# Patient Record
Sex: FEMALE | Race: WHITE
[De-identification: names, ages, dates, MRNs, and addresses within clinical notes are randomized per-mention and may not be internally consistent; named-entity substitution may affect disease eponyms.]

---

## 2018-07-06 ENCOUNTER — HOSPITAL ENCOUNTER (EMERGENCY)
Dept: HOSPITAL 83 - ED | Age: 58
Discharge: ANOTHER HOSPITAL | End: 2018-07-06
Payer: MEDICAID

## 2018-07-06 VITALS — WEIGHT: 275 LBS | BODY MASS INDEX: 53.99 KG/M2 | HEIGHT: 60 IN

## 2018-07-06 DIAGNOSIS — Z79.899: ICD-10-CM

## 2018-07-06 DIAGNOSIS — I77.89: Primary | ICD-10-CM

## 2018-07-06 PROBLEM — I70.90 ARTERIAL OCCLUSION: Status: ACTIVE | Noted: 2018-07-06

## 2018-07-06 LAB
ALBUMIN SERPL-MCNC: 3.8 GM/DL (ref 3.1–4.5)
ALP SERPL-CCNC: 79 U/L (ref 45–117)
ALT SERPL W P-5'-P-CCNC: 18 U/L (ref 12–78)
APTT PPP: 22.9 SECONDS (ref 20.8–31.5)
AST SERPL-CCNC: 21 IU/L (ref 3–35)
BASOPHILS # BLD AUTO: 0 10*3/UL (ref 0–0.1)
BASOPHILS NFR BLD AUTO: 0.4 % (ref 0–1)
BUN SERPL-MCNC: 31 MG/DL (ref 7–24)
CHLORIDE SERPL-SCNC: 111 MMOL/L (ref 98–107)
CREAT SERPL-MCNC: 1.09 MG/DL (ref 0.55–1.02)
EOSINOPHIL # BLD AUTO: 0 10*3/UL (ref 0–0.4)
EOSINOPHIL # BLD AUTO: 0.1 % (ref 1–4)
ERYTHROCYTE [DISTWIDTH] IN BLOOD BY AUTOMATED COUNT: 14.2 % (ref 0–14.5)
HCT VFR BLD AUTO: 36 % (ref 37–47)
HGB BLD-MCNC: 11.5 G/DL (ref 12–16)
INR BLD: 1 (ref 2–3.5)
LYMPHOCYTES # BLD AUTO: 0.8 10*3/UL (ref 1.3–4.4)
LYMPHOCYTES NFR BLD AUTO: 10.2 % (ref 27–41)
MCH RBC QN AUTO: 30.4 PG (ref 27–31)
MCHC RBC AUTO-ENTMCNC: 31.9 G/DL (ref 33–37)
MCV RBC AUTO: 95.2 FL (ref 81–99)
MONOCYTES # BLD AUTO: 0.4 10*3/UL (ref 0.1–1)
MONOCYTES NFR BLD MANUAL: 4.3 % (ref 3–9)
NEUT #: 6.8 10*3/UL (ref 2.3–7.9)
NEUT %: 84.9 % (ref 47–73)
NRBC BLD QL AUTO: 0 % (ref 0–0)
PLATELET # BLD AUTO: 163 10*3/UL (ref 130–400)
PMV BLD AUTO: 10.3 FL (ref 9.6–12.3)
POTASSIUM SERPL-SCNC: 4.7 MMOL/L (ref 3.5–5.1)
PROT SERPL-MCNC: 7.2 GM/DL (ref 6.4–8.2)
RBC # BLD AUTO: 3.78 10*6/UL (ref 4.1–5.1)
SODIUM SERPL-SCNC: 141 MMOL/L (ref 136–145)
WBC NRBC COR # BLD AUTO: 8.1 10*3/UL (ref 4.8–10.8)

## 2018-07-07 ENCOUNTER — APPOINTMENT (OUTPATIENT)
Dept: GENERAL RADIOLOGY | Age: 58
DRG: 181 | End: 2018-07-07
Attending: FAMILY MEDICINE
Payer: MEDICAID

## 2018-07-07 ENCOUNTER — APPOINTMENT (OUTPATIENT)
Dept: CT IMAGING | Age: 58
DRG: 181 | End: 2018-07-07
Attending: FAMILY MEDICINE
Payer: MEDICAID

## 2018-07-07 ENCOUNTER — ANESTHESIA (OUTPATIENT)
Dept: OPERATING ROOM | Age: 58
DRG: 181 | End: 2018-07-07
Payer: MEDICAID

## 2018-07-07 ENCOUNTER — ANESTHESIA EVENT (OUTPATIENT)
Dept: OPERATING ROOM | Age: 58
DRG: 181 | End: 2018-07-07
Payer: MEDICAID

## 2018-07-07 ENCOUNTER — HOSPITAL ENCOUNTER (INPATIENT)
Age: 58
LOS: 3 days | Discharge: HOME HEALTH | DRG: 181 | End: 2018-07-10
Attending: FAMILY MEDICINE | Admitting: INTERNAL MEDICINE
Payer: MEDICAID

## 2018-07-07 VITALS — TEMPERATURE: 95.5 F | SYSTOLIC BLOOD PRESSURE: 131 MMHG | DIASTOLIC BLOOD PRESSURE: 100 MMHG | OXYGEN SATURATION: 99 %

## 2018-07-07 PROBLEM — I48.0 PAROXYSMAL ATRIAL FIBRILLATION (HCC): Status: ACTIVE | Noted: 2018-07-07

## 2018-07-07 PROBLEM — E66.01 MORBID OBESITY WITH BMI OF 50.0-59.9, ADULT (HCC): Status: ACTIVE | Noted: 2018-07-07

## 2018-07-07 PROBLEM — I10 UNCONTROLLED HYPERTENSION: Status: ACTIVE | Noted: 2018-07-07

## 2018-07-07 PROBLEM — M79.7 FIBROMYALGIA: Status: ACTIVE | Noted: 2018-07-07

## 2018-07-07 PROBLEM — K21.9 GERD (GASTROESOPHAGEAL REFLUX DISEASE): Status: ACTIVE | Noted: 2018-07-07

## 2018-07-07 PROBLEM — Z79.01 ANTICOAGULANT LONG-TERM USE: Status: ACTIVE | Noted: 2018-07-07

## 2018-07-07 PROBLEM — D64.9 ANEMIA: Status: ACTIVE | Noted: 2018-07-07

## 2018-07-07 PROBLEM — I73.9 PERIPHERAL VASCULAR OCCLUSIVE DISEASE (HCC): Status: ACTIVE | Noted: 2018-07-07

## 2018-07-07 PROBLEM — E83.51 HYPOCALCEMIA: Status: ACTIVE | Noted: 2018-07-07

## 2018-07-07 PROBLEM — G89.29 CHRONIC PAIN: Status: ACTIVE | Noted: 2018-07-07

## 2018-07-07 LAB
ABO/RH: NORMAL
ANION GAP SERPL CALCULATED.3IONS-SCNC: 16 MMOL/L (ref 7–16)
ANTIBODY SCREEN: NORMAL
APTT: 103.2 SEC (ref 24.5–35.1)
APTT: 143 SEC (ref 24.5–35.1)
APTT: 42.7 SEC (ref 24.5–35.1)
BUN BLDV-MCNC: 26 MG/DL (ref 8–23)
CALCIUM IONIZED: 1.19 MMOL/L (ref 1.15–1.33)
CALCIUM SERPL-MCNC: 8.5 MG/DL (ref 8.6–10.2)
CHLORIDE BLD-SCNC: 107 MMOL/L (ref 98–107)
CHOLESTEROL, TOTAL: 192 MG/DL (ref 0–199)
CO2: 21 MMOL/L (ref 22–29)
CREAT SERPL-MCNC: 0.8 MG/DL (ref 0.5–1)
EKG ATRIAL RATE: 45 BPM
EKG Q-T INTERVAL: 486 MS
EKG QRS DURATION: 152 MS
EKG QTC CALCULATION (BAZETT): 524 MS
EKG R AXIS: -50 DEGREES
EKG T AXIS: 87 DEGREES
EKG VENTRICULAR RATE: 70 BPM
GFR AFRICAN AMERICAN: >60
GFR NON-AFRICAN AMERICAN: >60 ML/MIN/1.73
GLUCOSE BLD-MCNC: 82 MG/DL (ref 74–109)
HBA1C MFR BLD: 5.2 % (ref 4–5.6)
HCT VFR BLD CALC: 34.4 % (ref 34–48)
HDLC SERPL-MCNC: 55 MG/DL
HEMOGLOBIN: 11.4 G/DL (ref 11.5–15.5)
INR BLD: 1.1
LACTIC ACID: 1.1 MMOL/L (ref 0.5–2.2)
LDL CHOLESTEROL CALCULATED: 124 MG/DL (ref 0–99)
MCH RBC QN AUTO: 30.8 PG (ref 26–35)
MCHC RBC AUTO-ENTMCNC: 33.1 % (ref 32–34.5)
MCV RBC AUTO: 93 FL (ref 80–99.9)
PDW BLD-RTO: 14 FL (ref 11.5–15)
PLATELET # BLD: 122 E9/L (ref 130–450)
PMV BLD AUTO: 10.8 FL (ref 7–12)
POTASSIUM SERPL-SCNC: 4 MMOL/L (ref 3.5–5)
PROTHROMBIN TIME: 12.8 SEC (ref 9.3–12.4)
RBC # BLD: 3.7 E12/L (ref 3.5–5.5)
SODIUM BLD-SCNC: 144 MMOL/L (ref 132–146)
TOTAL CK: 307 U/L (ref 20–180)
TRIGL SERPL-MCNC: 64 MG/DL (ref 0–149)
TSH SERPL DL<=0.05 MIU/L-ACNC: 3.55 UIU/ML (ref 0.27–4.2)
VLDLC SERPL CALC-MCNC: 13 MG/DL
WBC # BLD: 5.9 E9/L (ref 4.5–11.5)

## 2018-07-07 PROCEDURE — 6360000002 HC RX W HCPCS: Performed by: FAMILY MEDICINE

## 2018-07-07 PROCEDURE — 85730 THROMBOPLASTIN TIME PARTIAL: CPT

## 2018-07-07 PROCEDURE — 93005 ELECTROCARDIOGRAM TRACING: CPT | Performed by: FAMILY MEDICINE

## 2018-07-07 PROCEDURE — 75635 CT ANGIO ABDOMINAL ARTERIES: CPT

## 2018-07-07 PROCEDURE — 2580000003 HC RX 258: Performed by: FAMILY MEDICINE

## 2018-07-07 PROCEDURE — 6370000000 HC RX 637 (ALT 250 FOR IP): Performed by: FAMILY MEDICINE

## 2018-07-07 PROCEDURE — 3700000000 HC ANESTHESIA ATTENDED CARE: Performed by: SURGERY

## 2018-07-07 PROCEDURE — 71045 X-RAY EXAM CHEST 1 VIEW: CPT

## 2018-07-07 PROCEDURE — 36415 COLL VENOUS BLD VENIPUNCTURE: CPT

## 2018-07-07 PROCEDURE — 83605 ASSAY OF LACTIC ACID: CPT

## 2018-07-07 PROCEDURE — 2500000003 HC RX 250 WO HCPCS: Performed by: NURSE ANESTHETIST, CERTIFIED REGISTERED

## 2018-07-07 PROCEDURE — 6360000002 HC RX W HCPCS: Performed by: NURSE ANESTHETIST, CERTIFIED REGISTERED

## 2018-07-07 PROCEDURE — 6360000002 HC RX W HCPCS

## 2018-07-07 PROCEDURE — 86901 BLOOD TYPING SEROLOGIC RH(D): CPT

## 2018-07-07 PROCEDURE — 82330 ASSAY OF CALCIUM: CPT

## 2018-07-07 PROCEDURE — 2580000003 HC RX 258: Performed by: STUDENT IN AN ORGANIZED HEALTH CARE EDUCATION/TRAINING PROGRAM

## 2018-07-07 PROCEDURE — C1757 CATH, THROMBECTOMY/EMBOLECT: HCPCS | Performed by: SURGERY

## 2018-07-07 PROCEDURE — 3600000002 HC SURGERY LEVEL 2 BASE: Performed by: SURGERY

## 2018-07-07 PROCEDURE — 80048 BASIC METABOLIC PNL TOTAL CA: CPT

## 2018-07-07 PROCEDURE — 2500000003 HC RX 250 WO HCPCS

## 2018-07-07 PROCEDURE — 80061 LIPID PANEL: CPT

## 2018-07-07 PROCEDURE — 83036 HEMOGLOBIN GLYCOSYLATED A1C: CPT

## 2018-07-07 PROCEDURE — 3600000012 HC SURGERY LEVEL 2 ADDTL 15MIN: Performed by: SURGERY

## 2018-07-07 PROCEDURE — 85027 COMPLETE CBC AUTOMATED: CPT

## 2018-07-07 PROCEDURE — 93010 ELECTROCARDIOGRAM REPORT: CPT | Performed by: INTERNAL MEDICINE

## 2018-07-07 PROCEDURE — 86850 RBC ANTIBODY SCREEN: CPT

## 2018-07-07 PROCEDURE — 84443 ASSAY THYROID STIM HORMONE: CPT

## 2018-07-07 PROCEDURE — 86900 BLOOD TYPING SEROLOGIC ABO: CPT

## 2018-07-07 PROCEDURE — 34201 REMOVAL OF ARTERY CLOT: CPT | Performed by: SURGERY

## 2018-07-07 PROCEDURE — 3700000001 HC ADD 15 MINUTES (ANESTHESIA): Performed by: SURGERY

## 2018-07-07 PROCEDURE — 2000000000 HC ICU R&B

## 2018-07-07 PROCEDURE — 85610 PROTHROMBIN TIME: CPT

## 2018-07-07 PROCEDURE — 6360000002 HC RX W HCPCS: Performed by: STUDENT IN AN ORGANIZED HEALTH CARE EDUCATION/TRAINING PROGRAM

## 2018-07-07 PROCEDURE — 2580000003 HC RX 258

## 2018-07-07 PROCEDURE — 04CK0ZZ EXTIRPATION OF MATTER FROM RIGHT FEMORAL ARTERY, OPEN APPROACH: ICD-10-PCS | Performed by: SURGERY

## 2018-07-07 PROCEDURE — 88304 TISSUE EXAM BY PATHOLOGIST: CPT

## 2018-07-07 PROCEDURE — 6370000000 HC RX 637 (ALT 250 FOR IP): Performed by: STUDENT IN AN ORGANIZED HEALTH CARE EDUCATION/TRAINING PROGRAM

## 2018-07-07 PROCEDURE — 82550 ASSAY OF CK (CPK): CPT

## 2018-07-07 PROCEDURE — 6360000004 HC RX CONTRAST MEDICATION: Performed by: RADIOLOGY

## 2018-07-07 RX ORDER — DABIGATRAN ETEXILATE 75 MG/1
75 CAPSULE, COATED PELLETS ORAL 2 TIMES DAILY
Status: ON HOLD | COMMUNITY
End: 2018-07-07

## 2018-07-07 RX ORDER — DEXTROSE MONOHYDRATE 50 MG/ML
100 INJECTION, SOLUTION INTRAVENOUS PRN
Status: DISCONTINUED | OUTPATIENT
Start: 2018-07-07 | End: 2018-07-07

## 2018-07-07 RX ORDER — ATORVASTATIN CALCIUM 40 MG/1
40 TABLET, FILM COATED ORAL NIGHTLY
Status: DISCONTINUED | OUTPATIENT
Start: 2018-07-07 | End: 2018-07-10 | Stop reason: HOSPADM

## 2018-07-07 RX ORDER — SODIUM CHLORIDE 0.9 % (FLUSH) 0.9 %
SYRINGE (ML) INJECTION
Status: DISCONTINUED
Start: 2018-07-07 | End: 2018-07-08

## 2018-07-07 RX ORDER — HYDROXYCHLOROQUINE SULFATE 200 MG/1
TABLET, FILM COATED ORAL DAILY
Status: ON HOLD | COMMUNITY
End: 2018-07-08

## 2018-07-07 RX ORDER — DEXTROSE MONOHYDRATE 25 G/50ML
12.5 INJECTION, SOLUTION INTRAVENOUS PRN
Status: DISCONTINUED | OUTPATIENT
Start: 2018-07-07 | End: 2018-07-07

## 2018-07-07 RX ORDER — METOPROLOL SUCCINATE 50 MG/1
50 TABLET, EXTENDED RELEASE ORAL 2 TIMES DAILY
Status: ON HOLD | COMMUNITY
End: 2018-07-08

## 2018-07-07 RX ORDER — METOPROLOL SUCCINATE 50 MG/1
50 TABLET, EXTENDED RELEASE ORAL 2 TIMES DAILY
Status: DISCONTINUED | OUTPATIENT
Start: 2018-07-07 | End: 2018-07-09

## 2018-07-07 RX ORDER — LEVOTHYROXINE SODIUM 0.03 MG/1
25 TABLET ORAL DAILY
Status: ON HOLD | COMMUNITY
End: 2018-07-10

## 2018-07-07 RX ORDER — NEOSTIGMINE METHYLSULFATE 1 MG/ML
INJECTION, SOLUTION INTRAVENOUS PRN
Status: DISCONTINUED | OUTPATIENT
Start: 2018-07-07 | End: 2018-07-07 | Stop reason: SDUPTHER

## 2018-07-07 RX ORDER — GLYCOPYRROLATE 1 MG/5 ML
SYRINGE (ML) INTRAVENOUS PRN
Status: DISCONTINUED | OUTPATIENT
Start: 2018-07-07 | End: 2018-07-07 | Stop reason: SDUPTHER

## 2018-07-07 RX ORDER — ONDANSETRON 2 MG/ML
4 INJECTION INTRAMUSCULAR; INTRAVENOUS EVERY 6 HOURS PRN
Status: DISCONTINUED | OUTPATIENT
Start: 2018-07-07 | End: 2018-07-10 | Stop reason: HOSPADM

## 2018-07-07 RX ORDER — SODIUM CHLORIDE, SODIUM LACTATE, POTASSIUM CHLORIDE, CALCIUM CHLORIDE 600; 310; 30; 20 MG/100ML; MG/100ML; MG/100ML; MG/100ML
INJECTION, SOLUTION INTRAVENOUS CONTINUOUS
Status: DISCONTINUED | OUTPATIENT
Start: 2018-07-07 | End: 2018-07-08

## 2018-07-07 RX ORDER — LISINOPRIL 5 MG/1
5 TABLET ORAL DAILY
Status: DISCONTINUED | OUTPATIENT
Start: 2018-07-07 | End: 2018-07-07

## 2018-07-07 RX ORDER — FENTANYL CITRATE 50 UG/ML
INJECTION, SOLUTION INTRAMUSCULAR; INTRAVENOUS PRN
Status: DISCONTINUED | OUTPATIENT
Start: 2018-07-07 | End: 2018-07-07 | Stop reason: SDUPTHER

## 2018-07-07 RX ORDER — HEPARIN SODIUM 1000 [USP'U]/ML
INJECTION, SOLUTION INTRAVENOUS; SUBCUTANEOUS PRN
Status: DISCONTINUED | OUTPATIENT
Start: 2018-07-07 | End: 2018-07-07 | Stop reason: SDUPTHER

## 2018-07-07 RX ORDER — ONDANSETRON 2 MG/ML
INJECTION INTRAMUSCULAR; INTRAVENOUS PRN
Status: DISCONTINUED | OUTPATIENT
Start: 2018-07-07 | End: 2018-07-07 | Stop reason: SDUPTHER

## 2018-07-07 RX ORDER — NICOTINE POLACRILEX 4 MG
15 LOZENGE BUCCAL PRN
Status: DISCONTINUED | OUTPATIENT
Start: 2018-07-07 | End: 2018-07-07

## 2018-07-07 RX ORDER — HEPARIN SODIUM 1000 [USP'U]/ML
60 INJECTION, SOLUTION INTRAVENOUS; SUBCUTANEOUS PRN
Status: DISCONTINUED | OUTPATIENT
Start: 2018-07-07 | End: 2018-07-10

## 2018-07-07 RX ORDER — ERGOCALCIFEROL (VITAMIN D2) 1250 MCG
50000 CAPSULE ORAL WEEKLY
Status: ON HOLD | COMMUNITY
End: 2018-07-08

## 2018-07-07 RX ORDER — FOLIC ACID 1 MG/1
1 TABLET ORAL DAILY
Status: ON HOLD | COMMUNITY
End: 2018-07-08

## 2018-07-07 RX ORDER — ACETAMINOPHEN 500 MG
500 TABLET ORAL EVERY 6 HOURS PRN
Status: ON HOLD | COMMUNITY
End: 2018-07-10 | Stop reason: HOSPADM

## 2018-07-07 RX ORDER — MIDAZOLAM HYDROCHLORIDE 1 MG/ML
INJECTION INTRAMUSCULAR; INTRAVENOUS PRN
Status: DISCONTINUED | OUTPATIENT
Start: 2018-07-07 | End: 2018-07-07 | Stop reason: SDUPTHER

## 2018-07-07 RX ORDER — FOLIC ACID 1 MG/1
1 TABLET ORAL DAILY
Status: DISCONTINUED | OUTPATIENT
Start: 2018-07-07 | End: 2018-07-10 | Stop reason: HOSPADM

## 2018-07-07 RX ORDER — SODIUM CHLORIDE 9 MG/ML
INJECTION, SOLUTION INTRAVENOUS CONTINUOUS PRN
Status: DISCONTINUED | OUTPATIENT
Start: 2018-07-07 | End: 2018-07-07 | Stop reason: SDUPTHER

## 2018-07-07 RX ORDER — LEVOTHYROXINE SODIUM 0.03 MG/1
25 TABLET ORAL DAILY
Status: DISCONTINUED | OUTPATIENT
Start: 2018-07-07 | End: 2018-07-10 | Stop reason: HOSPADM

## 2018-07-07 RX ORDER — MORPHINE SULFATE 2 MG/ML
2 INJECTION, SOLUTION INTRAMUSCULAR; INTRAVENOUS
Status: DISCONTINUED | OUTPATIENT
Start: 2018-07-07 | End: 2018-07-08

## 2018-07-07 RX ORDER — OXYCODONE HYDROCHLORIDE AND ACETAMINOPHEN 5; 325 MG/1; MG/1
2 TABLET ORAL EVERY 4 HOURS PRN
Status: DISCONTINUED | OUTPATIENT
Start: 2018-07-07 | End: 2018-07-09

## 2018-07-07 RX ORDER — LISINOPRIL 10 MG/1
5 TABLET ORAL DAILY
Status: ON HOLD | COMMUNITY
End: 2018-07-08

## 2018-07-07 RX ORDER — ERGOCALCIFEROL 1.25 MG/1
50000 CAPSULE ORAL WEEKLY
Status: DISCONTINUED | OUTPATIENT
Start: 2018-07-07 | End: 2018-07-07 | Stop reason: CLARIF

## 2018-07-07 RX ORDER — OXYCODONE HYDROCHLORIDE AND ACETAMINOPHEN 5; 325 MG/1; MG/1
1 TABLET ORAL EVERY 4 HOURS PRN
Status: DISCONTINUED | OUTPATIENT
Start: 2018-07-07 | End: 2018-07-09

## 2018-07-07 RX ORDER — ROCURONIUM BROMIDE 10 MG/ML
INJECTION, SOLUTION INTRAVENOUS PRN
Status: DISCONTINUED | OUTPATIENT
Start: 2018-07-07 | End: 2018-07-07 | Stop reason: SDUPTHER

## 2018-07-07 RX ORDER — HYDROXYCHLOROQUINE SULFATE 200 MG/1
200 TABLET, FILM COATED ORAL DAILY
Status: DISCONTINUED | OUTPATIENT
Start: 2018-07-07 | End: 2018-07-10

## 2018-07-07 RX ORDER — HYDRALAZINE HYDROCHLORIDE 20 MG/ML
10 INJECTION INTRAMUSCULAR; INTRAVENOUS EVERY 30 MIN PRN
Status: DISCONTINUED | OUTPATIENT
Start: 2018-07-07 | End: 2018-07-08

## 2018-07-07 RX ORDER — ACETAMINOPHEN 325 MG/1
650 TABLET ORAL EVERY 6 HOURS PRN
Status: DISCONTINUED | OUTPATIENT
Start: 2018-07-07 | End: 2018-07-10 | Stop reason: HOSPADM

## 2018-07-07 RX ORDER — LABETALOL HYDROCHLORIDE 5 MG/ML
10 INJECTION, SOLUTION INTRAVENOUS EVERY 30 MIN PRN
Status: DISCONTINUED | OUTPATIENT
Start: 2018-07-07 | End: 2018-07-08

## 2018-07-07 RX ORDER — LIDOCAINE HYDROCHLORIDE 20 MG/ML
INJECTION, SOLUTION INFILTRATION; PERINEURAL PRN
Status: DISCONTINUED | OUTPATIENT
Start: 2018-07-07 | End: 2018-07-07 | Stop reason: SDUPTHER

## 2018-07-07 RX ORDER — HEPARIN SODIUM 1000 [USP'U]/ML
30 INJECTION, SOLUTION INTRAVENOUS; SUBCUTANEOUS PRN
Status: DISCONTINUED | OUTPATIENT
Start: 2018-07-07 | End: 2018-07-10

## 2018-07-07 RX ORDER — PROPOFOL 10 MG/ML
INJECTION, EMULSION INTRAVENOUS PRN
Status: DISCONTINUED | OUTPATIENT
Start: 2018-07-07 | End: 2018-07-07 | Stop reason: SDUPTHER

## 2018-07-07 RX ORDER — HEPARIN SODIUM 10000 [USP'U]/100ML
12 INJECTION, SOLUTION INTRAVENOUS CONTINUOUS
Status: DISCONTINUED | OUTPATIENT
Start: 2018-07-07 | End: 2018-07-10

## 2018-07-07 RX ORDER — ERGOCALCIFEROL 1.25 MG/1
50000 CAPSULE ORAL WEEKLY
Status: DISCONTINUED | OUTPATIENT
Start: 2018-07-10 | End: 2018-07-10 | Stop reason: HOSPADM

## 2018-07-07 RX ORDER — DEXAMETHASONE SODIUM PHOSPHATE 10 MG/ML
INJECTION, SOLUTION INTRAMUSCULAR; INTRAVENOUS PRN
Status: DISCONTINUED | OUTPATIENT
Start: 2018-07-07 | End: 2018-07-07 | Stop reason: SDUPTHER

## 2018-07-07 RX ADMIN — METOPROLOL SUCCINATE 50 MG: 50 TABLET, FILM COATED, EXTENDED RELEASE ORAL at 21:13

## 2018-07-07 RX ADMIN — OXYCODONE HYDROCHLORIDE AND ACETAMINOPHEN 2 TABLET: 5; 325 TABLET ORAL at 17:18

## 2018-07-07 RX ADMIN — DEXAMETHASONE SODIUM PHOSPHATE 10 MG: 10 INJECTION, SOLUTION INTRAMUSCULAR; INTRAVENOUS at 14:04

## 2018-07-07 RX ADMIN — LEVOTHYROXINE SODIUM 25 MCG: 25 TABLET ORAL at 06:40

## 2018-07-07 RX ADMIN — SODIUM CHLORIDE, POTASSIUM CHLORIDE, SODIUM LACTATE AND CALCIUM CHLORIDE: 600; 310; 30; 20 INJECTION, SOLUTION INTRAVENOUS at 03:08

## 2018-07-07 RX ADMIN — ACETAMINOPHEN 650 MG: 325 TABLET, FILM COATED ORAL at 02:20

## 2018-07-07 RX ADMIN — FOLIC ACID 1 MG: 1 TABLET ORAL at 08:37

## 2018-07-07 RX ADMIN — FENTANYL CITRATE 50 MCG: 50 INJECTION, SOLUTION INTRAMUSCULAR; INTRAVENOUS at 14:31

## 2018-07-07 RX ADMIN — FENTANYL CITRATE 50 MCG: 50 INJECTION, SOLUTION INTRAMUSCULAR; INTRAVENOUS at 14:04

## 2018-07-07 RX ADMIN — HEPARIN SODIUM 12 UNITS/KG/HR: 10000 INJECTION, SOLUTION INTRAVENOUS at 19:55

## 2018-07-07 RX ADMIN — METOPROLOL SUCCINATE 50 MG: 50 TABLET, FILM COATED, EXTENDED RELEASE ORAL at 08:37

## 2018-07-07 RX ADMIN — ONDANSETRON 4 MG: 2 INJECTION INTRAMUSCULAR; INTRAVENOUS at 15:20

## 2018-07-07 RX ADMIN — MIDAZOLAM HYDROCHLORIDE 2 MG: 1 INJECTION, SOLUTION INTRAMUSCULAR; INTRAVENOUS at 13:58

## 2018-07-07 RX ADMIN — LIDOCAINE HYDROCHLORIDE 60 MG: 20 INJECTION, SOLUTION INFILTRATION; PERINEURAL at 14:04

## 2018-07-07 RX ADMIN — HYDROXYCHLOROQUINE SULFATE 200 MG: 200 TABLET, FILM COATED ORAL at 08:37

## 2018-07-07 RX ADMIN — IOPAMIDOL 120 ML: 755 INJECTION, SOLUTION INTRAVENOUS at 10:35

## 2018-07-07 RX ADMIN — ATORVASTATIN CALCIUM 40 MG: 40 TABLET, FILM COATED ORAL at 21:13

## 2018-07-07 RX ADMIN — LISINOPRIL 5 MG: 5 TABLET ORAL at 08:37

## 2018-07-07 RX ADMIN — Medication 3 MG: at 15:20

## 2018-07-07 RX ADMIN — HEPARIN SODIUM 5000 UNITS: 1000 INJECTION, SOLUTION INTRAVENOUS; SUBCUTANEOUS at 14:42

## 2018-07-07 RX ADMIN — Medication 0.6 MG: at 15:20

## 2018-07-07 RX ADMIN — CEFAZOLIN 3 G: 1 INJECTION, POWDER, FOR SOLUTION INTRAVENOUS at 14:18

## 2018-07-07 RX ADMIN — ROCURONIUM BROMIDE 40 MG: 10 INJECTION, SOLUTION INTRAVENOUS at 14:04

## 2018-07-07 RX ADMIN — SODIUM CHLORIDE: 9 INJECTION, SOLUTION INTRAVENOUS at 13:58

## 2018-07-07 RX ADMIN — PROPOFOL 150 MG: 10 INJECTION, EMULSION INTRAVENOUS at 14:04

## 2018-07-07 ASSESSMENT — PULMONARY FUNCTION TESTS
PIF_VALUE: 20
PIF_VALUE: 24
PIF_VALUE: 26
PIF_VALUE: 17
PIF_VALUE: 26
PIF_VALUE: 26
PIF_VALUE: 27
PIF_VALUE: 24
PIF_VALUE: 26
PIF_VALUE: 20
PIF_VALUE: 31
PIF_VALUE: 24
PIF_VALUE: 27
PIF_VALUE: 25
PIF_VALUE: 26
PIF_VALUE: 24
PIF_VALUE: 2
PIF_VALUE: 25
PIF_VALUE: 25
PIF_VALUE: 24
PIF_VALUE: 26
PIF_VALUE: 25
PIF_VALUE: 25
PIF_VALUE: 23
PIF_VALUE: 26
PIF_VALUE: 25
PIF_VALUE: 26
PIF_VALUE: 20
PIF_VALUE: 9
PIF_VALUE: 27
PIF_VALUE: 25
PIF_VALUE: 26
PIF_VALUE: 24
PIF_VALUE: 0
PIF_VALUE: 26
PIF_VALUE: 24
PIF_VALUE: 26
PIF_VALUE: 25
PIF_VALUE: 3
PIF_VALUE: 1
PIF_VALUE: 1
PIF_VALUE: 26
PIF_VALUE: 0
PIF_VALUE: 20
PIF_VALUE: 24
PIF_VALUE: 20
PIF_VALUE: 26
PIF_VALUE: 25
PIF_VALUE: 24
PIF_VALUE: 26
PIF_VALUE: 24
PIF_VALUE: 25
PIF_VALUE: 24
PIF_VALUE: 26
PIF_VALUE: 20
PIF_VALUE: 20
PIF_VALUE: 24
PIF_VALUE: 17
PIF_VALUE: 26
PIF_VALUE: 6
PIF_VALUE: 25
PIF_VALUE: 9
PIF_VALUE: 26
PIF_VALUE: 26
PIF_VALUE: 25
PIF_VALUE: 1
PIF_VALUE: 17
PIF_VALUE: 24
PIF_VALUE: 25
PIF_VALUE: 35
PIF_VALUE: 24
PIF_VALUE: 26
PIF_VALUE: 25
PIF_VALUE: 17
PIF_VALUE: 25
PIF_VALUE: 26
PIF_VALUE: 24
PIF_VALUE: 26
PIF_VALUE: 4
PIF_VALUE: 17
PIF_VALUE: 2
PIF_VALUE: 24
PIF_VALUE: 24
PIF_VALUE: 27
PIF_VALUE: 0
PIF_VALUE: 25
PIF_VALUE: 24
PIF_VALUE: 25
PIF_VALUE: 24

## 2018-07-07 ASSESSMENT — PAIN DESCRIPTION - ORIENTATION: ORIENTATION: RIGHT

## 2018-07-07 ASSESSMENT — PAIN DESCRIPTION - DESCRIPTORS: DESCRIPTORS: ACHING;DISCOMFORT;THROBBING

## 2018-07-07 ASSESSMENT — PAIN SCALES - GENERAL
PAINLEVEL_OUTOF10: 0
PAINLEVEL_OUTOF10: 4
PAINLEVEL_OUTOF10: 2
PAINLEVEL_OUTOF10: 4
PAINLEVEL_OUTOF10: 7
PAINLEVEL_OUTOF10: 0
PAINLEVEL_OUTOF10: 0

## 2018-07-07 ASSESSMENT — PAIN DESCRIPTION - LOCATION: LOCATION: LEG

## 2018-07-07 ASSESSMENT — PAIN DESCRIPTION - ONSET: ONSET: ON-GOING

## 2018-07-07 ASSESSMENT — PAIN DESCRIPTION - PROGRESSION: CLINICAL_PROGRESSION: NOT CHANGED

## 2018-07-07 ASSESSMENT — PAIN DESCRIPTION - PAIN TYPE: TYPE: ACUTE PAIN

## 2018-07-07 ASSESSMENT — PAIN DESCRIPTION - FREQUENCY: FREQUENCY: CONTINUOUS

## 2018-07-07 NOTE — H&P
infarction ([facility]     COPD (chronic obstructive pulmonary disease) ([facility]     Hypertension        Past Surgical History:          Procedure Laterality Date    BACK SURGERY         Medications Prior to Admission:      Prior to Admission medications    Medication Sig Start Date End Date Taking? Authorizing Provider   metoprolol succinate (TOPROL XL) 50 MG extended release tablet Take 50 mg by mouth 2 times daily   Yes Historical Provider, MD   ergocalciferol (ERGOCALCIFEROL) 08187 units capsule Take 50,000 Units by mouth once a week   Yes Historical Provider, MD   folic acid (FOLVITE) 1 MG tablet Take 1 mg by mouth daily   Yes Historical Provider, MD   lisinopril (PRINIVIL;ZESTRIL) 10 MG tablet Take 5 mg by mouth daily   Yes Historical Provider, MD   acetaminophen (TYLENOL) 500 MG tablet Take 500 mg by mouth every 6 hours as needed for Pain   Yes Historical Provider, MD   levothyroxine (SYNTHROID) 25 MCG tablet Take 25 mcg by mouth Daily   Yes Historical Provider, MD   hydroxychloroquine (PLAQUENIL) 200 MG tablet Take by mouth daily   Yes Historical Provider, MD       Allergies:  Pcn [penicillins]    Social History:      The patient currently lives At home. TOBACCO:   has no tobacco history on file. ETOH:   has no alcohol history on file. No drug use. Family History:     Reviewed in detail Positive as follows: Diabetes and cancer. No family history on file. REVIEW OF SYSTEMS:   Pertinent positives as noted in the HPI. All other systems reviewed and negative. PHYSICAL EXAM:    BP (!) 177/94   Pulse 84   Temp 98.7 °F (37.1 °C) (Oral)   Resp 16   Ht 5' 0.25\" (1.53 m)   Wt 299 lb 13.2 oz (136 kg)   SpO2 96%   BMI 58.07 kg/m²     General appearance:  Middle-age female, morbidly obese, mild to moderate painful  distress, appears stated age and cooperative. Afebrile. HEENT:  Normal cephalic, atraumatic without obvious deformity. Pupils equal, round, and reactive to light. Extra ocular muscles intact. Conjunctivae/corneas clear. Neck: Supple, with full range of motion. No jugular venous distention. Trachea midline. Respiratory:  Normal respiratory effort. Clear to auscultation, bilaterally without Rales/Wheezes/Rhonchi. Cardiovascular:  Regular rate and rhythm with normal S1/S2 without murmurs, rubs or gallops. Abdomen: Soft, non-tender, non-distended with normal bowel sounds. Musculoskeletal:  No clubbing, cyanosis or edema bilaterally. Full range of motion without deformity. Skin: Skin color, texture, turgor normal.  Right foot gray in color and cold to touch. Neurologic:  Cranial nerves: II-XII intact, grossly non-focal.  Psychiatric:  Alert and oriented, thought content appropriate, normal insight  Capillary Refill: Brisk,< 3 seconds   Peripheral Pulses: +1 palpable, left foot, no pulse palpable or by Doppler in the right foot. Labs: Documented above. No results for input(s): WBC, HGB, HCT, PLT in the last 72 hours. No results for input(s): NA, K, CL, CO2, BUN, CREATININE, CALCIUM, PHOS in the last 72 hours. Invalid input(s): MAGNES  No results for input(s): AST, ALT, BILIDIR, BILITOT, ALKPHOS in the last 72 hours. No results for input(s): INR in the last 72 hours. No results for input(s): Deward Feil in the last 72 hours. Urinalysis:    No results found for: Yung Distance, BACTERIA, RBCUA, BLOODU, [address], [address]    Radiology: Documented above.       No orders to display       ASSESSMENT:    Active Hospital Problems    Diagnosis Date Noted    Peripheral vascular occlusive disease ([address] [I73.9] 07/07/2018    Paroxysmal atrial fibrillation ([facility] [I48.0] 07/07/2018    Anemia [D64.9] 07/07/2018    Hypocalcemia [E83.51] 07/07/2018    Uncontrolled hypertension [I10] 07/07/2018    GERD (gastroesophageal reflux disease) [K21.9] 07/07/2018    Fibromyalgia [M79.7] 07/07/2018    Chronic pain [G89.29] 07/07/2018    Anticoagulant long-term use [Z79.01] 07/07/2018    Morbid obesity with BMI of 50.0-59.9, adult ([facility] [E66.01, Z68.43] 07/07/2018    Arterial occlusion ([facility] [I74.9] 07/06/2018     PLAN:   1. Severe peripheral vascular disease with ischemic right foot. Consult vascular surgery. Continue IV heparin. Monitor coagulation profile. IV morphine as needed for pain. Nothing by mouth. IV fluids. Patient would need CTA with runoff/angiography and intervention, will defer imaging studies to vascular surgery. Statin regimen, check lipid panel. Add aspirin to regimen. Check lactate and CPK. #2. Paroxysmal atrial fibrillation currently in sinus rate controlled. She ran out of her blood thinner for 3 weeks. Currently on heparin drip. Anticoagulation should be restarted once off the drip. #3. Hypocalcemia, check ionized calcium and replace. #4. Hypothyroidism, TSH  5. Uncontrolled hypertension, resume home meds. 6. Chronic pain with fibromyalgia, continue home medications. 7. GERD. Resume home meds. #8. Morbid obesity, lifestyle modification. DVT Prophylaxis: IV heparin  Diet:  NPO  Code Status: No Order FULL    PT/OT Eval Status: eval and treat when appropriate    Dispo - inpx/tele       Robles Pearson MD    Thank you No primary care provider on file. for the opportunity to be involved in this patient's care.

## 2018-07-07 NOTE — CONSULTS
Vascular Surgery Consultation Note    Reason for Consult:  RLE ischemia    HPI:    This is a 59 y.o. female who presents for evaluation of right leg pain. She states the pain began yesterday and got progressively worse. She states she \"tried everything\" without improvement. She denies a history of smoking. She denies a history of hyperlipidemia. She is on Pradaxa for a history of atrial fibrillation. She has been noncompliant for the last 3 weeks with her [address]. She states she also has a pacemaker after trying multiple medications to address rhythm issues. She does have a history of CVA, COPD, and HTN. She has never seen a Vascular Surgeon.       ROS: Negative if blank [], Positive if [x]  General Vascular   [] Fevers [] Claudication (Blocks)   [] Chills [] Rest Pain   [] Weight Loss [] Tissue Loss   [] Chest Pain [] Clotting Disorder   [] SOB at rest [x] Leg Swelling   [] SOB with exertion [] DVT/PE      [] Nausea    [] Vomiting [x] Stroke/TIA   [] Abdominal Pain [] Focal weakness   [] Melena [] Slurred Speech   [] Hematochezia [] Vision Changes   [] Hematuria    [] Dysuria [] Hx of Central Catheters   [] Wears Glasses/Contacts [] Dialysis and If so date initiated   [] Blindness       [] Difficulty swallowing        Past Medical History:   Diagnosis Date    Cerebral artery occlusion with cerebral infarction ([facility]     COPD (chronic obstructive pulmonary disease) ([facility]     Hypertension         Past Surgical History:   Procedure Laterality Date    BACK SURGERY         Current Medications:    heparin (porcine) 10 Units/kg/hr (07/07/18 0218)    lactated ringers 50 mL/hr at 07/07/18 0308      heparin (porcine), heparin (porcine), morphine, ondansetron, acetaminophen    folic acid  1 mg Oral Daily    hydroxychloroquine  200 mg Oral Daily    levothyroxine  25 mcg Oral Daily    lisinopril  5 mg Oral Daily    metoprolol succinate  50 mg Oral BID    atorvastatin  40 mg Oral Nightly    [START ON 7/10/2018] vitamin D

## 2018-07-08 LAB
ANION GAP SERPL CALCULATED.3IONS-SCNC: 11 MMOL/L (ref 7–16)
APTT: 102.5 SEC (ref 24.5–35.1)
APTT: 133.7 SEC (ref 24.5–35.1)
APTT: 41.9 SEC (ref 24.5–35.1)
BASOPHILS ABSOLUTE: 0 E9/L (ref 0–0.2)
BASOPHILS RELATIVE PERCENT: 0.2 % (ref 0–2)
BUN BLDV-MCNC: 18 MG/DL (ref 8–23)
CALCIUM IONIZED: 1.2 MMOL/L (ref 1.15–1.33)
CALCIUM SERPL-MCNC: 8.5 MG/DL (ref 8.6–10.2)
CHLORIDE BLD-SCNC: 106 MMOL/L (ref 98–107)
CO2: 22 MMOL/L (ref 22–29)
CREAT SERPL-MCNC: 0.8 MG/DL (ref 0.5–1)
EOSINOPHILS ABSOLUTE: 0 E9/L (ref 0.05–0.5)
EOSINOPHILS RELATIVE PERCENT: 0 % (ref 0–6)
GFR AFRICAN AMERICAN: >60
GFR NON-AFRICAN AMERICAN: >60 ML/MIN/1.73
GLUCOSE BLD-MCNC: 125 MG/DL (ref 74–109)
HCT VFR BLD CALC: 33.5 % (ref 34–48)
HEMOGLOBIN: 11 G/DL (ref 11.5–15.5)
LYMPHOCYTES ABSOLUTE: 0.18 E9/L (ref 1.5–4)
LYMPHOCYTES RELATIVE PERCENT: 2.6 % (ref 20–42)
MAGNESIUM: 2 MG/DL (ref 1.6–2.6)
MCH RBC QN AUTO: 30.4 PG (ref 26–35)
MCHC RBC AUTO-ENTMCNC: 32.8 % (ref 32–34.5)
MCV RBC AUTO: 92.5 FL (ref 80–99.9)
MONOCYTES ABSOLUTE: 0.24 E9/L (ref 0.1–0.95)
MONOCYTES RELATIVE PERCENT: 3.5 % (ref 2–12)
NEUTROPHILS ABSOLUTE: 5.55 E9/L (ref 1.8–7.3)
NEUTROPHILS RELATIVE PERCENT: 93.9 % (ref 43–80)
PDW BLD-RTO: 13.8 FL (ref 11.5–15)
PHOSPHORUS: 3.8 MG/DL (ref 2.5–4.5)
PLATELET # BLD: 138 E9/L (ref 130–450)
PMV BLD AUTO: 10.9 FL (ref 7–12)
POTASSIUM SERPL-SCNC: 4.9 MMOL/L (ref 3.5–5)
RBC # BLD: 3.62 E12/L (ref 3.5–5.5)
RBC # BLD: NORMAL 10*6/UL
SODIUM BLD-SCNC: 139 MMOL/L (ref 132–146)
WBC # BLD: 5.9 E9/L (ref 4.5–11.5)

## 2018-07-08 PROCEDURE — 2140000000 HC CCU INTERMEDIATE R&B

## 2018-07-08 PROCEDURE — 2000000000 HC ICU R&B

## 2018-07-08 PROCEDURE — 83735 ASSAY OF MAGNESIUM: CPT

## 2018-07-08 PROCEDURE — 6370000000 HC RX 637 (ALT 250 FOR IP): Performed by: INTERNAL MEDICINE

## 2018-07-08 PROCEDURE — 85025 COMPLETE CBC W/AUTO DIFF WBC: CPT

## 2018-07-08 PROCEDURE — 2580000003 HC RX 258: Performed by: STUDENT IN AN ORGANIZED HEALTH CARE EDUCATION/TRAINING PROGRAM

## 2018-07-08 PROCEDURE — 80048 BASIC METABOLIC PNL TOTAL CA: CPT

## 2018-07-08 PROCEDURE — 6370000000 HC RX 637 (ALT 250 FOR IP): Performed by: STUDENT IN AN ORGANIZED HEALTH CARE EDUCATION/TRAINING PROGRAM

## 2018-07-08 PROCEDURE — 82330 ASSAY OF CALCIUM: CPT

## 2018-07-08 PROCEDURE — 6370000000 HC RX 637 (ALT 250 FOR IP): Performed by: FAMILY MEDICINE

## 2018-07-08 PROCEDURE — 85730 THROMBOPLASTIN TIME PARTIAL: CPT

## 2018-07-08 PROCEDURE — 2580000003 HC RX 258: Performed by: SURGERY

## 2018-07-08 PROCEDURE — 36415 COLL VENOUS BLD VENIPUNCTURE: CPT

## 2018-07-08 PROCEDURE — 6360000002 HC RX W HCPCS: Performed by: FAMILY MEDICINE

## 2018-07-08 PROCEDURE — 84100 ASSAY OF PHOSPHORUS: CPT

## 2018-07-08 RX ORDER — HYDROXYCHLOROQUINE SULFATE 200 MG/1
200 TABLET, FILM COATED ORAL 2 TIMES DAILY
Status: DISCONTINUED | OUTPATIENT
Start: 2018-07-08 | End: 2018-07-10

## 2018-07-08 RX ORDER — HYDROXYCHLOROQUINE SULFATE 200 MG/1
200 TABLET, FILM COATED ORAL 2 TIMES DAILY
Status: ON HOLD | COMMUNITY
End: 2018-07-10

## 2018-07-08 RX ORDER — SODIUM CHLORIDE 0.9 % (FLUSH) 0.9 %
SYRINGE (ML) INJECTION
Status: DISCONTINUED
Start: 2018-07-08 | End: 2018-07-08

## 2018-07-08 RX ORDER — SODIUM CHLORIDE 9 MG/ML
INJECTION, SOLUTION INTRAVENOUS CONTINUOUS
Status: ACTIVE | OUTPATIENT
Start: 2018-07-08 | End: 2018-07-08

## 2018-07-08 RX ORDER — ALENDRONATE SODIUM 70 MG/1
70 TABLET ORAL
COMMUNITY

## 2018-07-08 RX ORDER — CHOLECALCIFEROL (VITAMIN D3) 50 MCG
2000 TABLET ORAL DAILY
Status: DISCONTINUED | OUTPATIENT
Start: 2018-07-08 | End: 2018-07-10 | Stop reason: HOSPADM

## 2018-07-08 RX ADMIN — METOPROLOL SUCCINATE 50 MG: 50 TABLET, FILM COATED, EXTENDED RELEASE ORAL at 08:00

## 2018-07-08 RX ADMIN — ATORVASTATIN CALCIUM 40 MG: 40 TABLET, FILM COATED ORAL at 21:19

## 2018-07-08 RX ADMIN — SODIUM CHLORIDE: 9 INJECTION, SOLUTION INTRAVENOUS at 15:47

## 2018-07-08 RX ADMIN — Medication 2000 UNITS: at 16:00

## 2018-07-08 RX ADMIN — SODIUM CHLORIDE, POTASSIUM CHLORIDE, SODIUM LACTATE AND CALCIUM CHLORIDE: 600; 310; 30; 20 INJECTION, SOLUTION INTRAVENOUS at 06:03

## 2018-07-08 RX ADMIN — OXYCODONE HYDROCHLORIDE AND ACETAMINOPHEN 2 TABLET: 5; 325 TABLET ORAL at 13:32

## 2018-07-08 RX ADMIN — HEPARIN SODIUM 7 UNITS/KG/HR: 10000 INJECTION, SOLUTION INTRAVENOUS at 17:26

## 2018-07-08 RX ADMIN — LEVOTHYROXINE SODIUM 25 MCG: 25 TABLET ORAL at 06:37

## 2018-07-08 RX ADMIN — HEPARIN SODIUM 4080 UNITS: 1000 INJECTION, SOLUTION INTRAVENOUS; SUBCUTANEOUS at 21:20

## 2018-07-08 ASSESSMENT — PAIN SCALES - GENERAL
PAINLEVEL_OUTOF10: 0
PAINLEVEL_OUTOF10: 8
PAINLEVEL_OUTOF10: 0

## 2018-07-08 NOTE — CARE COORDINATION
SOCIAL WORK AND DISCHARGE PLANNING: I met with pt in the unit. She is to have PT and OT to eval. She is not interested in hch. She is open to mark If needed. I left a mark list with her and will follow up with her after therapy evals are done. Pt lives with her brother in a mobile home. There is a steep ramp that pt said when it rains she is not able to go up on into the home. She said she stays in most of the time and only leaves on nice days and when has doctor appt's. No dme or hhc pta. She is interested in a mark in [place] if mark is recommended. sw to follow. Uday Campo. 7/8/2018

## 2018-07-08 NOTE — OP NOTE
[address]                   [address] [id number],  [facility]                                 OPERATIVE REPORT    PATIENT NAME: Edmundo Sanderson                      :        1960  MED REC NO:   [id number]                            ROOM:       3816  ACCOUNT NO:   [de-identified]                           ADMIT DATE: 2018  PROVIDER:     Collis Landau    DATE OF PROCEDURE:  2018    SURGEON:  Melody Martin MD    ASSISTANT:  Collis Landau, MD PGY-5    PREOPERATIVE DIAGNOSIS:  Acute right lower extremity ischemia, presumed  cardioembolic. POSTOPERATIVE DIAGNOSIS:  Acute right lower extremity ischemia, presumed  cardioembolic. OPERATIONS PERFORMED:  Right femoral embolectomy. ANESTHESIA:  General.    ESTIMATED BLOOD LOSS:  300 mL. COMPLICATIONS:  None. SPECIMENS:  Embolus. HISTORY:  The patient is a 59-year-old female with a significant past  medical history notable for atrial fibrillation, for which she has been on  oral anticoagulation for many years. She has been noncompliant with oral  anticoagulation for the last 3 weeks. She developed acute right lower  extremity pain and numbness yesterday. When symptoms failed to resolve at  home, she presented to the emergency department. She was found to have  absent pulses in the right lower extremity below the level of the femoral  artery. Vascular Surgery was consulted for right lower extremity ischemia. This was felt likely to be cardioembolic in origin. CTA of the abdominal  aorta with bilateral lower extremity run off was obtained and demonstrated  occlusion of the profunda femoris artery and distal occlusion of the  superficial femoral artery. Right femoral embolectomy was recommended to  the patient. The risks, benefits, and alternatives of the procedure were  discussed with her. She stated her understanding and agreed to proceed.     OPERATIVE PROCEDURE:  The patient was subcutaneous tissue was approximated with multiple layers of 2-0  Vicryl sutures. The dermal layer was approximated with multiple interrupted  3-0 Vicryl sutures. The skin was approximated with skin clips. Area was  cleaned, dried, and a sterile dressing was applied. Needle, sponge, and  instrument counts were reported as correct x2 at the conclusion of the  case. Dr. Nieves Hansen was present and scrubbed throughout the case. The  patient tolerated the procedure well without complications. She was  transferred to the cardiovascular intensive care unit in good condition.         Fabienne Dixon  D: 07/07/2018 19:50:11       T: 07/07/2018 20:59:52     [email or web address]  Job#: [id number]     Doc#: [id number]  CC:

## 2018-07-08 NOTE — PROGRESS NOTES
Hospitalist Progress Note      PCP: No primary care provider on file. Date of Admission: 7/7/2018    Chief Complaint: right leg pain    Hospital Course: *found to have a right femoral thrombus, had it removed on yesterday     Subjective: *no complaints      Medications:  Reviewed    Infusion Medications    heparin (porcine) 7 Units/kg/hr (07/08/18 1229)     Scheduled Medications    folic acid  1 mg Oral Daily    hydroxychloroquine  200 mg Oral Daily    levothyroxine  25 mcg Oral Daily    metoprolol succinate  50 mg Oral BID    atorvastatin  40 mg Oral Nightly    [START ON 7/10/2018] vitamin D  50,000 Units Oral Weekly     PRN Meds: heparin (porcine), heparin (porcine), ondansetron, acetaminophen, oxyCODONE-acetaminophen **OR** oxyCODONE-acetaminophen      Intake/Output Summary (Last 24 hours) at 07/08/18 1306  Last data filed at 07/08/18 0900   Gross per 24 hour   Intake          2370.42 ml   Output             1225 ml   Net          1145.42 ml       Exam:    /87   Pulse 70   Temp 97.9 °F (36.6 °C) (Temporal)   Resp 16   Ht 5' 0.25\" (1.53 m)   Wt 293 lb 11.2 oz (133.2 kg)   SpO2 96%   BMI 56.88 kg/m²           Gen: well  developed  HEENT: NC/AT, moist mucous membranes, no oropharyngeal erythema or exudate  Neck: supple, trachea midline, no anterior cervical or SC LAD  Heart:  irregular   Lungs:  cta bilaterally,  Abd: bowel sounds present, soft, nontender, nondistended, no masses  Extrem:  No clubbing, cyanosis,  Pos  edema  Skin: no rashes or lesions  Psych: A & O x3  Neuro: grossly intact, moves all four extremities.     Capillary Refill: Brisk,< 3 seconds   Peripheral Pulses: +2 palpable, equal bilaterally               Labs:   Recent Labs      07/07/18   1615  07/08/18   0322   WBC  5.9  5.9   HGB  11.4*  11.0*   HCT  34.4  33.5*   PLT  122*  138     Recent Labs      07/07/18   0533  07/08/18   0322   NA  144  139   K  4.0  4.9   CL  107  106   CO2  21*  22   BUN  26*  18   CREATININE 0.8  0.8   CALCIUM  8.5*  8.5*   PHOS   --   3.8     No results for input(s): AST, ALT, BILIDIR, BILITOT, ALKPHOS in the last 72 hours. Recent Labs      07/07/18   0533   INR  1.1     Recent Labs      07/07/18   0533   CKTOTAL  307*     No results for input(s): AST, ALT, ALB, BILIDIR, BILITOT, ALKPHOS in the last 72 hours.   Recent Labs      07/07/18   0533   LACTA  1.1     No results found for: [place]  No results found for: AMMONIA    Assessment:    Active Hospital Problems    Diagnosis Date Noted    Peripheral vascular occlusive disease ([facility] [I73.9] 07/07/2018    [facility]oxysmal [facility] fibrillation ([facility] [I48.0] 07/07/2018    Anemia [D64.9] 07/07/2018    Hypocalcemia [E83.51] 07/07/2018    Uncontrolled hypertension [I10] 07/07/2018    GERD (gastroesophageal reflux disease) [K21.9] 07/07/2018    Fibromyalgia [M79.7] 07/07/2018    Chronic pain [G89.29] 07/07/2018    Anticoagulant long-term use [Z79.01] 07/07/2018    Morbid obesity with BMI of 50.0-59.9, adult ([facility] [E66.01, Z68.43] 07/07/2018    Thrombosis of right femoral artery (HCC) [I74.3]     Arterial occlusion ([facility] [I74.9] 07/06/2018   HLD  hypothyroid  Plan:   Cont lipitor   cont plaqeunil  Cont synthroid  Cont toprol     DVT Prophylaxis: heparin  Diet: DIET CARDIAC;  Code Status: Full Code    PT/OT Eval Status: when stable    Dispo - home at discharge      Electronically signed by Heike Reyes DO on 7/8/2018 at 1:06 PM

## 2018-07-08 NOTE — PLAN OF CARE
Problem: Falls - Risk of:  Goal: Will remain free from falls  Will remain free from falls   Outcome: Met This Shift      Problem: Pain:  Goal: Pain level will decrease  Pain level will decrease   Outcome: Met This Shift      Problem: Bleeding:  Goal: Will show no signs and symptoms of excessive bleeding  Will show no signs and symptoms of excessive bleeding   Outcome: Met This Shift      Problem: Venous Thromboembolism:  Goal: Will show no signs or symptoms of venous thromboembolism  Will show no signs or symptoms of venous thromboembolism   Outcome: Met This Shift      Problem: Risk for Impaired Skin Integrity  Goal: Tissue integrity - skin and mucous membranes  Structural intactness and normal physiological function of skin and  mucous membranes.    Outcome: Met This Shift

## 2018-07-08 NOTE — PLAN OF CARE
Problem: Falls - Risk of:  Goal: Will remain free from falls  Will remain free from falls   Outcome: Ongoing    Goal: Absence of physical injury  Absence of physical injury   Outcome: Ongoing      Problem: Pain:  Goal: Pain level will decrease  Pain level will decrease   Outcome: Ongoing    Goal: Control of acute pain  Control of acute pain   Outcome: Ongoing    Goal: Control of chronic pain  Control of chronic pain   Outcome: Ongoing

## 2018-07-09 PROBLEM — R26.9 GAIT DISORDER: Chronic | Status: ACTIVE | Noted: 2018-07-09

## 2018-07-09 LAB
ANION GAP SERPL CALCULATED.3IONS-SCNC: 11 MMOL/L (ref 7–16)
APTT: 31.9 SEC (ref 24.5–35.1)
APTT: 72.8 SEC (ref 24.5–35.1)
APTT: 82 SEC (ref 24.5–35.1)
BASOPHILS ABSOLUTE: 0.01 E9/L (ref 0–0.2)
BASOPHILS RELATIVE PERCENT: 0.1 % (ref 0–2)
BUN BLDV-MCNC: 31 MG/DL (ref 8–23)
CALCIUM SERPL-MCNC: 8.4 MG/DL (ref 8.6–10.2)
CHLORIDE BLD-SCNC: 106 MMOL/L (ref 98–107)
CO2: 23 MMOL/L (ref 22–29)
CREAT SERPL-MCNC: 1 MG/DL (ref 0.5–1)
EOSINOPHILS ABSOLUTE: 0.04 E9/L (ref 0.05–0.5)
EOSINOPHILS RELATIVE PERCENT: 0.5 % (ref 0–6)
GFR AFRICAN AMERICAN: >60
GFR NON-AFRICAN AMERICAN: 56 ML/MIN/1.73
GLUCOSE BLD-MCNC: 104 MG/DL (ref 74–109)
HCT VFR BLD CALC: 32.6 % (ref 34–48)
HEMOGLOBIN: 10.4 G/DL (ref 11.5–15.5)
IMMATURE GRANULOCYTES #: 0.03 E9/L
IMMATURE GRANULOCYTES %: 0.3 % (ref 0–5)
LYMPHOCYTES ABSOLUTE: 0.94 E9/L (ref 1.5–4)
LYMPHOCYTES RELATIVE PERCENT: 10.9 % (ref 20–42)
MCH RBC QN AUTO: 30.3 PG (ref 26–35)
MCHC RBC AUTO-ENTMCNC: 31.9 % (ref 32–34.5)
MCV RBC AUTO: 95 FL (ref 80–99.9)
MONOCYTES ABSOLUTE: 0.58 E9/L (ref 0.1–0.95)
MONOCYTES RELATIVE PERCENT: 6.7 % (ref 2–12)
NEUTROPHILS ABSOLUTE: 7.04 E9/L (ref 1.8–7.3)
NEUTROPHILS RELATIVE PERCENT: 81.5 % (ref 43–80)
PDW BLD-RTO: 14.4 FL (ref 11.5–15)
PLATELET # BLD: 141 E9/L (ref 130–450)
PMV BLD AUTO: 11.2 FL (ref 7–12)
POTASSIUM SERPL-SCNC: 4.4 MMOL/L (ref 3.5–5)
RBC # BLD: 3.43 E12/L (ref 3.5–5.5)
SODIUM BLD-SCNC: 140 MMOL/L (ref 132–146)
WBC # BLD: 8.6 E9/L (ref 4.5–11.5)

## 2018-07-09 PROCEDURE — 85025 COMPLETE CBC W/AUTO DIFF WBC: CPT

## 2018-07-09 PROCEDURE — 6360000002 HC RX W HCPCS: Performed by: FAMILY MEDICINE

## 2018-07-09 PROCEDURE — 6370000000 HC RX 637 (ALT 250 FOR IP): Performed by: INTERNAL MEDICINE

## 2018-07-09 PROCEDURE — 2140000000 HC CCU INTERMEDIATE R&B

## 2018-07-09 PROCEDURE — G8978 MOBILITY CURRENT STATUS: HCPCS

## 2018-07-09 PROCEDURE — G8979 MOBILITY GOAL STATUS: HCPCS

## 2018-07-09 PROCEDURE — 85730 THROMBOPLASTIN TIME PARTIAL: CPT

## 2018-07-09 PROCEDURE — 97530 THERAPEUTIC ACTIVITIES: CPT

## 2018-07-09 PROCEDURE — 97165 OT EVAL LOW COMPLEX 30 MIN: CPT

## 2018-07-09 PROCEDURE — 6370000000 HC RX 637 (ALT 250 FOR IP): Performed by: FAMILY MEDICINE

## 2018-07-09 PROCEDURE — 2580000003 HC RX 258: Performed by: STUDENT IN AN ORGANIZED HEALTH CARE EDUCATION/TRAINING PROGRAM

## 2018-07-09 PROCEDURE — 2580000003 HC RX 258: Performed by: FAMILY MEDICINE

## 2018-07-09 PROCEDURE — 97535 SELF CARE MNGMENT TRAINING: CPT

## 2018-07-09 PROCEDURE — 80048 BASIC METABOLIC PNL TOTAL CA: CPT

## 2018-07-09 PROCEDURE — 97161 PT EVAL LOW COMPLEX 20 MIN: CPT

## 2018-07-09 PROCEDURE — 36415 COLL VENOUS BLD VENIPUNCTURE: CPT

## 2018-07-09 RX ORDER — OXYCODONE HYDROCHLORIDE AND ACETAMINOPHEN 5; 325 MG/1; MG/1
1 TABLET ORAL EVERY 4 HOURS PRN
Status: DISCONTINUED | OUTPATIENT
Start: 2018-07-09 | End: 2018-07-10 | Stop reason: HOSPADM

## 2018-07-09 RX ORDER — SODIUM CHLORIDE, SODIUM LACTATE, POTASSIUM CHLORIDE, CALCIUM CHLORIDE 600; 310; 30; 20 MG/100ML; MG/100ML; MG/100ML; MG/100ML
INJECTION, SOLUTION INTRAVENOUS CONTINUOUS
Status: DISCONTINUED | OUTPATIENT
Start: 2018-07-09 | End: 2018-07-10

## 2018-07-09 RX ORDER — METOPROLOL SUCCINATE 25 MG/1
25 TABLET, EXTENDED RELEASE ORAL 2 TIMES DAILY
Status: DISCONTINUED | OUTPATIENT
Start: 2018-07-09 | End: 2018-07-10 | Stop reason: HOSPADM

## 2018-07-09 RX ORDER — 0.9 % SODIUM CHLORIDE 0.9 %
250 INTRAVENOUS SOLUTION INTRAVENOUS ONCE
Status: COMPLETED | OUTPATIENT
Start: 2018-07-09 | End: 2018-07-09

## 2018-07-09 RX ORDER — SODIUM CHLORIDE, SODIUM LACTATE, POTASSIUM CHLORIDE, AND CALCIUM CHLORIDE .6; .31; .03; .02 G/100ML; G/100ML; G/100ML; G/100ML
500 INJECTION, SOLUTION INTRAVENOUS ONCE
Status: COMPLETED | OUTPATIENT
Start: 2018-07-09 | End: 2018-07-09

## 2018-07-09 RX ADMIN — METOPROLOL SUCCINATE 50 MG: 50 TABLET, FILM COATED, EXTENDED RELEASE ORAL at 08:16

## 2018-07-09 RX ADMIN — Medication 2000 UNITS: at 08:16

## 2018-07-09 RX ADMIN — LEVOTHYROXINE SODIUM 25 MCG: 25 TABLET ORAL at 06:36

## 2018-07-09 RX ADMIN — HEPARIN SODIUM 9 UNITS/KG/HR: 10000 INJECTION, SOLUTION INTRAVENOUS at 18:01

## 2018-07-09 RX ADMIN — SODIUM CHLORIDE 250 ML: 9 INJECTION, SOLUTION INTRAVENOUS at 16:30

## 2018-07-09 RX ADMIN — SODIUM CHLORIDE, POTASSIUM CHLORIDE, SODIUM LACTATE AND CALCIUM CHLORIDE: 600; 310; 30; 20 INJECTION, SOLUTION INTRAVENOUS at 07:49

## 2018-07-09 RX ADMIN — SODIUM CHLORIDE, POTASSIUM CHLORIDE, SODIUM LACTATE AND CALCIUM CHLORIDE: 600; 310; 30; 20 INJECTION, SOLUTION INTRAVENOUS at 15:50

## 2018-07-09 RX ADMIN — HEPARIN SODIUM 4080 UNITS: 1000 INJECTION, SOLUTION INTRAVENOUS; SUBCUTANEOUS at 12:10

## 2018-07-09 RX ADMIN — ATORVASTATIN CALCIUM 40 MG: 40 TABLET, FILM COATED ORAL at 20:55

## 2018-07-09 RX ADMIN — SODIUM CHLORIDE, POTASSIUM CHLORIDE, SODIUM LACTATE AND CALCIUM CHLORIDE 500 ML: 600; 310; 30; 20 INJECTION, SOLUTION INTRAVENOUS at 06:39

## 2018-07-09 ASSESSMENT — PAIN SCALES - GENERAL
PAINLEVEL_OUTOF10: 0

## 2018-07-09 NOTE — PROGRESS NOTES
Hospital Medicine Progress Note      Date of Admission: 7/7/2018  Chief Complaint:  had no chief complaint listed for this encounter. Primary diagnoses: Thrombosis of right femoral artery (HCC)    Subjective  Pain improved today  Transferred to [facility] after SICU this AM  Stable overnight. No other overnight issues reported. Exam:    BP (!) 92/54 Comment: MANUAL  Pulse 70   Temp 98.2 °F (36.8 °C) (Oral)   Resp 20   Ht 5' 0.25\" (1.53 m)   Wt 293 lb 11.2 oz (133.2 kg)   SpO2 95%   BMI 56.88 kg/m²     General appearance: No apparent distress, appears stated age and cooperative. HEENT: Pupils equal, round, and reactive to light. Conjunctivae/corneas clear. Neck: Supple. No jugular venous distention. Trachea midline. Respiratory:  Normal respiratory effort. Clear to auscultation, bilaterally without Rales/Wheezes/Rhonchi. Cardiovascular: Regular rate and rhythm with normal S1/S2 without murmurs, rubs or gallops. Abdomen: Soft, non-tender, non-distended with normal bowel sounds. Musculoskeletal: No clubbing, cyanosis + edema bilaterally. Brisk capillary refill. 2+ lower extremity pulses (dorsalis pedis).    Skin:  No rashes    Neurologic: awake, alert and following commands     Medications:  Reviewed    Infusion Medications    lactated ringers 75 mL/hr at 07/09/18 1550    heparin (porcine) 9 Units/kg/hr (07/09/18 1211)     Scheduled Medications    vitamin D  2,000 Units Oral Daily    hydroxychloroquine  200 mg Oral BID    methotrexate  5 mg Oral Weekly    folic acid  1 mg Oral Daily    hydroxychloroquine  200 mg Oral Daily    levothyroxine  25 mcg Oral Daily    metoprolol succinate  50 mg Oral BID    atorvastatin  40 mg Oral Nightly    [START ON 7/10/2018] vitamin D  50,000 Units Oral Weekly     PRN Meds: oxyCODONE-acetaminophen **OR** [DISCONTINUED] oxyCODONE-acetaminophen, heparin (porcine), heparin (porcine), ondansetron, acetaminophen    I/O    Intake/Output Summary (Last 24 hours) at 07/09/18 1604  Last data filed at 07/09/18 1344   Gross per 24 hour   Intake             1606 ml   Output              875 ml   Net              731 ml       Labs:   Recent Labs      07/07/18   1615  07/08/18   0322  07/09/18   0330   WBC  5.9  5.9  8.6   HGB  11.4*  11.0*  10.4*   HCT  34.4  33.5*  32.6*   PLT  122*  138  141       Recent Labs      07/07/18   0533  07/08/18   0322  07/09/18   0330   NA  144  139  140   K  4.0  4.9  4.4   CL  107  106  106   CO2  21*  22  23   BUN  26*  18  31*   CREATININE  0.8  0.8  1.0   CALCIUM  8.5*  8.5*  8.4*   PHOS   --   3.8   --        No results for input(s): PROT, ALB, ALKPHOS, ALT, AST, BILITOT, AMYLASE, LIPASE in the last 72 hours. Recent Labs      07/07/18   0533   INR  1.1       Recent Labs      07/07/18   0533   CKTOTAL  307*       Chronic labs:  Lab Results   Component Value Date    CHOL 192 07/07/2018    TRIG 64 07/07/2018    HDL 55 07/07/2018    LDLCALC 124 (H) 07/07/2018    TSH 3.550 07/07/2018    INR 1.1 07/07/2018    LABA1C 5.2 07/07/2018       Radiology:  Imaging studies reviewed today. ASSESSMENT:    Principal Problem: Thrombosis of right femoral artery (HCC)  Active Problems:    Arterial occlusion (HCC)    Peripheral vascular occlusive disease (HCC)    Paroxysmal atrial fibrillation (HCC)    Anemia    Hypocalcemia    Uncontrolled hypertension    GERD (gastroesophageal reflux disease)    Fibromyalgia    Chronic pain    Anticoagulant long-term use    Morbid obesity with BMI of 50.0-59.9, adult (HCC)    Gait disorder  Resolved Problems:    * No resolved hospital problems. *       Body mass index is 56.88 kg/m². PLAN:    Needs wheeled walker  s/p R femoral embolectomy, C/w heparin ggt > await vascular recs for [address]  C/w lipitor  C/w plaquenil and mtx   OK for discharge from vascular. Remove right groin dressing prior to discharge. Discharge likely tomorrow.       Diet: DIET CARDIAC;  Code Status: Full Code     PT/OT Eval Status: [] Ordered

## 2018-07-09 NOTE — PLAN OF CARE
Problem: Falls - Risk of:  Goal: Will remain free from falls  Will remain free from falls   Outcome: Met This Shift    Goal: Absence of physical injury  Absence of physical injury   Outcome: Met This Shift      Problem: Pain:  Goal: Pain level will decrease  Pain level will decrease   Outcome: Met This Shift    Goal: Control of acute pain  Control of acute pain   Outcome: Met This Shift    Goal: Control of chronic pain  Control of chronic pain   Outcome: Completed Date Met: 07/09/18      Problem: Bleeding:  Goal: Will show no signs and symptoms of excessive bleeding  Will show no signs and symptoms of excessive bleeding   Outcome: Met This Shift      Problem: Venous Thromboembolism:  Goal: Will show no signs or symptoms of venous thromboembolism  Will show no signs or symptoms of venous thromboembolism   Outcome: Met This Shift    Goal: Absence of signs or symptoms of impaired coagulation  Absence of signs or symptoms of impaired coagulation   Outcome: Met This Shift      Problem: Risk for Impaired Skin Integrity  Goal: Tissue integrity - skin and mucous membranes  Structural intactness and normal physiological function of skin and  mucous membranes.    Outcome: Met This Shift

## 2018-07-09 NOTE — PROGRESS NOTES
CVICU Progress Note    Name: Kamlesh Turner  MRN: [id number]    CC: Postoperative Critical Care Management     Indication for Surgery: Acute right lower extremity ischemia, presumed cardioembolic    Important/Relevant PMH/PSH:  HTN, COPD    Procedure/Surgeries: 7/7/2018 Right femoral embolectomy      Intake/Output Summary (Last 24 hours) at 07/09/18 1017  Last data filed at 07/09/18 0900   Gross per 24 hour   Intake          2012.32 ml   Output              950 ml   Net          1062.32 ml       Recent Labs      07/07/18   1615  07/08/18   0322  07/09/18   0330   WBC  5.9  5.9  8.6   HGB  11.4*  11.0*  10.4*   HCT  34.4  33.5*  32.6*   PLT  122*  138  141      Recent Labs      07/07/18   0533  07/08/18   0322  07/09/18   0330   BUN  26*  18  31*   CREATININE  0.8  0.8  1.0       Physical Exam:    /64   Pulse 74   Temp 97.3 °F (36.3 °C) (Temporal)   Resp 15   Ht 5' 0.25\" (1.53 m)   Wt 293 lb 11.2 oz (133.2 kg)   SpO2 94%   BMI 56.88 kg/m²       General: Awake, alert. Tolerating diet. Denies CP, SOB, palpitations   Eyes: PERRLA, anicteric   Pulmonary: Diminished bibasilar. No wheezes, no accessory muscle use noted   Cardiovascular:  RRR, no heaves or thrills on palpation  Abdomen: Soft, nontender, +BS   Extremities: Palpable pulses all extremities, 1-2+ edema   Neurologic/Psych: A&Ox3, DANIEL to command   Skin: Warm and dry  Incisions: Right groin silver dressing with small amount of strike through      Assessment/Plan: POD #2    1. Acute right lower extremity ischemia, presumed cardioembolic S/p Right femoral embolectomy  - Continue frequent neurovascular checks  - Marginal UOP and BP yesterday with slight increase in BUN this morning. IVF bolus given and IVF resumed at 75cc/hr.  Improved BP, continue to monitor UOP and labs    - PRN percocet for pain control   - Continue Heparin gtt per nomogram, transition to [facility] per vascular surgery- pending insurance and compliance   - PT/OT, Social work for [address]: Transfer to [facility]    Electronically signed by KOBY Pisano CNP on 7/9/2018 at 9:10 AM

## 2018-07-10 VITALS
BODY MASS INDEX: 57.52 KG/M2 | RESPIRATION RATE: 18 BRPM | HEIGHT: 60 IN | HEART RATE: 81 BPM | DIASTOLIC BLOOD PRESSURE: 65 MMHG | TEMPERATURE: 98.2 F | SYSTOLIC BLOOD PRESSURE: 136 MMHG | OXYGEN SATURATION: 98 % | WEIGHT: 293 LBS

## 2018-07-10 PROBLEM — I70.90 ARTERIAL OCCLUSION: Status: RESOLVED | Noted: 2018-07-06 | Resolved: 2018-07-10

## 2018-07-10 PROBLEM — E83.51 HYPOCALCEMIA: Status: RESOLVED | Noted: 2018-07-07 | Resolved: 2018-07-10

## 2018-07-10 LAB
ANION GAP SERPL CALCULATED.3IONS-SCNC: 12 MMOL/L (ref 7–16)
BASOPHILS ABSOLUTE: 0.03 E9/L (ref 0–0.2)
BASOPHILS RELATIVE PERCENT: 0.5 % (ref 0–2)
BUN BLDV-MCNC: 28 MG/DL (ref 8–23)
CALCIUM SERPL-MCNC: 8.5 MG/DL (ref 8.6–10.2)
CHLORIDE BLD-SCNC: 103 MMOL/L (ref 98–107)
CO2: 20 MMOL/L (ref 22–29)
CREAT SERPL-MCNC: 0.9 MG/DL (ref 0.5–1)
EOSINOPHILS ABSOLUTE: 0.13 E9/L (ref 0.05–0.5)
EOSINOPHILS RELATIVE PERCENT: 2 % (ref 0–6)
GFR AFRICAN AMERICAN: >60
GFR NON-AFRICAN AMERICAN: >60 ML/MIN/1.73
GLUCOSE BLD-MCNC: 89 MG/DL (ref 74–109)
HCT VFR BLD CALC: 31.6 % (ref 34–48)
HEMOGLOBIN: 10.2 G/DL (ref 11.5–15.5)
IMMATURE GRANULOCYTES #: 0.02 E9/L
IMMATURE GRANULOCYTES %: 0.3 % (ref 0–5)
LYMPHOCYTES ABSOLUTE: 1.25 E9/L (ref 1.5–4)
LYMPHOCYTES RELATIVE PERCENT: 18.9 % (ref 20–42)
MCH RBC QN AUTO: 30.7 PG (ref 26–35)
MCHC RBC AUTO-ENTMCNC: 32.3 % (ref 32–34.5)
MCV RBC AUTO: 95.2 FL (ref 80–99.9)
MONOCYTES ABSOLUTE: 0.55 E9/L (ref 0.1–0.95)
MONOCYTES RELATIVE PERCENT: 8.3 % (ref 2–12)
NEUTROPHILS ABSOLUTE: 4.65 E9/L (ref 1.8–7.3)
NEUTROPHILS RELATIVE PERCENT: 70 % (ref 43–80)
PDW BLD-RTO: 14.6 FL (ref 11.5–15)
PLATELET # BLD: 135 E9/L (ref 130–450)
PMV BLD AUTO: 11.6 FL (ref 7–12)
POTASSIUM SERPL-SCNC: 4.5 MMOL/L (ref 3.5–5)
RBC # BLD: 3.32 E12/L (ref 3.5–5.5)
SODIUM BLD-SCNC: 135 MMOL/L (ref 132–146)
WBC # BLD: 6.6 E9/L (ref 4.5–11.5)

## 2018-07-10 PROCEDURE — 80048 BASIC METABOLIC PNL TOTAL CA: CPT

## 2018-07-10 PROCEDURE — 6370000000 HC RX 637 (ALT 250 FOR IP): Performed by: FAMILY MEDICINE

## 2018-07-10 PROCEDURE — 6370000000 HC RX 637 (ALT 250 FOR IP): Performed by: INTERNAL MEDICINE

## 2018-07-10 PROCEDURE — 2580000003 HC RX 258: Performed by: STUDENT IN AN ORGANIZED HEALTH CARE EDUCATION/TRAINING PROGRAM

## 2018-07-10 PROCEDURE — 97535 SELF CARE MNGMENT TRAINING: CPT

## 2018-07-10 PROCEDURE — 85025 COMPLETE CBC W/AUTO DIFF WBC: CPT

## 2018-07-10 PROCEDURE — 36415 COLL VENOUS BLD VENIPUNCTURE: CPT

## 2018-07-10 PROCEDURE — 6370000000 HC RX 637 (ALT 250 FOR IP): Performed by: STUDENT IN AN ORGANIZED HEALTH CARE EDUCATION/TRAINING PROGRAM

## 2018-07-10 RX ORDER — DULOXETIN HYDROCHLORIDE 30 MG/1
30 CAPSULE, DELAYED RELEASE ORAL DAILY
Status: DISCONTINUED | OUTPATIENT
Start: 2018-07-10 | End: 2018-07-10 | Stop reason: HOSPADM

## 2018-07-10 RX ORDER — DULOXETIN HYDROCHLORIDE 30 MG/1
30 CAPSULE, DELAYED RELEASE ORAL DAILY
COMMUNITY

## 2018-07-10 RX ORDER — METOPROLOL SUCCINATE 25 MG/1
25 TABLET, EXTENDED RELEASE ORAL 2 TIMES DAILY
Qty: 60 TABLET | Refills: 3 | Status: SHIPPED | OUTPATIENT
Start: 2018-07-10 | End: 2018-07-11 | Stop reason: HOSPADM

## 2018-07-10 RX ORDER — LEVOTHYROXINE SODIUM 0.03 MG/1
25 TABLET ORAL DAILY
Qty: 30 TABLET | Refills: 3 | Status: SHIPPED | OUTPATIENT
Start: 2018-07-11

## 2018-07-10 RX ORDER — ATORVASTATIN CALCIUM 40 MG/1
40 TABLET, FILM COATED ORAL NIGHTLY
Qty: 30 TABLET | Refills: 3 | Status: SHIPPED | OUTPATIENT
Start: 2018-07-10

## 2018-07-10 RX ADMIN — SODIUM CHLORIDE, POTASSIUM CHLORIDE, SODIUM LACTATE AND CALCIUM CHLORIDE: 600; 310; 30; 20 INJECTION, SOLUTION INTRAVENOUS at 06:25

## 2018-07-10 RX ADMIN — RIVAROXABAN 20 MG: 20 TABLET, FILM COATED ORAL at 08:54

## 2018-07-10 RX ADMIN — ERGOCALCIFEROL 50000 UNITS: 1.25 CAPSULE ORAL at 08:54

## 2018-07-10 RX ADMIN — Medication 2000 UNITS: at 08:53

## 2018-07-10 RX ADMIN — FOLIC ACID 1 MG: 1 TABLET ORAL at 08:53

## 2018-07-10 RX ADMIN — METOPROLOL SUCCINATE 25 MG: 25 TABLET, FILM COATED, EXTENDED RELEASE ORAL at 08:53

## 2018-07-10 RX ADMIN — LEVOTHYROXINE SODIUM 25 MCG: 25 TABLET ORAL at 06:25

## 2018-07-10 ASSESSMENT — PAIN SCALES - GENERAL
PAINLEVEL_OUTOF10: 0

## 2018-07-10 NOTE — PROGRESS NOTES
to use of LB dressing AE for safe reach and energy conservation. Pt interested in further education- recommend for next tx session  Min A  Pt instructed on LB AE. Pt demo'd ability to don/doff socks and pants with AE with assist required to utilize sockaid. Pt verbalized/demonstrated fair understanding of LB AE. **Pt provided with hip kit this date for increased independence with LB ADL's upon discharge**   Bathing Mod A; recommend long handled sponge  Min A  Pt instructed on utilizing LH sponge for increased independence with LB bathing. Toileting  SBA Per Eval    Bed Mobility  Supine to Sit: SBA  Sit to Supine:SBA  Per Eval  Pt sitting up in bed prior to and after tx. Functional Transfers SBA sit<>stand  Commode: SBA using rail; min cues for safe hand placement without rail   Sit<>Stand: SBA  Min cues for safety. Functional Mobility SBA short distance no device; SBA using [employer]. Pt can benefit from use of [employer] for energy conservation and balance. Pt c/o h/o back pain.  Per Eval  Will continue to assess. Sit balance: SBA/S  Stand balance: SBA with w/w  Endurance/Activity tolerance: Fair    Comments: Upon arrival pt sitting up in chair with B LE's eleavted. Pt educated on transfer/mobility safety, LB AE, EC techniques and fall prevention. Pt verbalized/demonstrated fair understanding, recommend continued education. At end of session pt remained sitting up in chair with B LE's elevated, all lines and tubes intact and call light within reach. · Pt has made fair progress towards set goals.    · Continue with current plan of care    Time in: 1030  Time out:1049  Total Tx Time: [facility]/L [id number]

## 2018-07-10 NOTE — PROGRESS NOTES
Vascular Surgery Progress Note    Pt is being seen in f/u today regarding RLE ischemia    Subjective  Pt s/e. Comfortable laying in bed. Denies leg pain. Current Medications:    lactated ringers 75 mL/hr at 07/09/18 1550    heparin (porcine) 9 Units/kg/hr (07/09/18 1801)      oxyCODONE-acetaminophen **OR** [DISCONTINUED] oxyCODONE-acetaminophen, heparin (porcine), heparin (porcine), ondansetron, acetaminophen    metoprolol succinate  25 mg Oral BID    vitamin D  2,000 Units Oral Daily    hydroxychloroquine  200 mg Oral BID    methotrexate  5 mg Oral Weekly    folic acid  1 mg Oral Daily    hydroxychloroquine  200 mg Oral Daily    levothyroxine  25 mcg Oral Daily    atorvastatin  40 mg Oral Nightly    vitamin D  50,000 Units Oral Weekly        PHYSICAL EXAM:    /67   Pulse 76   Temp 98.5 °F (36.9 °C) (Oral)   Resp 18   Ht 5' 0.25\" (1.53 m)   Wt 293 lb 12.8 oz (133.3 kg)   SpO2 96%   BMI 56.90 kg/m²     Intake/Output Summary (Last 24 hours) at 07/10/18 0555  Last data filed at 07/10/18 0426   Gross per 24 hour   Intake              546 ml   Output             1125 ml   Net             -579 ml          Gen: awake, alert and oriented x3, no apparent distress  CVS: RRR  Resp: No increased work of breathing  Abd: Soft, non-tender, non-distended  R LE: groin dressing with some strikethrough. 1+ palpable DP/PT, byphasic signals  L LE: warm    LABS:    Lab Results   Component Value Date    WBC 8.6 07/09/2018    HGB 10.4 (L) 07/09/2018    HCT 32.6 (L) 07/09/2018     07/09/2018    PROTIME 12.8 (H) 07/07/2018    INR 1.1 07/07/2018    APTT 72.8 (H) 07/09/2018    K 4.4 07/09/2018    BUN 31 (H) 07/09/2018    CREATININE 1.0 07/09/2018       A/P  59 y.o. female s/p femoral embolectomy    Plan  Okay to DC from vascular. Remove rt groin dressing at time of DC.     Electronically signed by Jared Hunter on 7/10/2018 at 7:15 AM

## 2018-07-11 RX ORDER — METOPROLOL SUCCINATE 50 MG/1
25 TABLET, EXTENDED RELEASE ORAL 2 TIMES DAILY
Qty: 30 TABLET | Refills: 1 | Status: SHIPPED | OUTPATIENT
Start: 2018-07-11

## 2018-07-24 ENCOUNTER — OFFICE VISIT (OUTPATIENT)
Dept: VASCULAR SURGERY | Age: 58
End: 2018-07-24

## 2018-07-24 DIAGNOSIS — I73.9 PERIPHERAL VASCULAR OCCLUSIVE DISEASE (HCC): Primary | ICD-10-CM

## 2018-07-24 PROCEDURE — 99999 PR OFFICE/OUTPT VISIT,PROCEDURE ONLY: CPT | Performed by: NURSE PRACTITIONER

## 2019-07-25 RX ORDER — ERGOCALCIFEROL 1.25 MG/1
2 CAPSULE ORAL WEEKLY
Refills: 0 | COMMUNITY
Start: 2019-07-15

## 2019-11-20 ENCOUNTER — HOSPITAL ENCOUNTER (EMERGENCY)
Dept: HOSPITAL 83 - ED | Age: 59
End: 2019-11-20
Payer: MEDICAID

## 2019-11-20 ENCOUNTER — HOSPITAL ENCOUNTER (INPATIENT)
Age: 59
LOS: 5 days | Discharge: HOME | DRG: 045 | End: 2019-11-25
Attending: HOSPITALIST | Admitting: FAMILY MEDICINE
Payer: MEDICAID

## 2019-11-20 VITALS — BODY MASS INDEX: 51.54 KG/M2 | HEIGHT: 60 IN | WEIGHT: 262.5 LBS

## 2019-11-20 DIAGNOSIS — I10: ICD-10-CM

## 2019-11-20 DIAGNOSIS — I63.9: Primary | ICD-10-CM

## 2019-11-20 DIAGNOSIS — Z79.899: ICD-10-CM

## 2019-11-20 DIAGNOSIS — R42: ICD-10-CM

## 2019-11-20 DIAGNOSIS — R11.2: ICD-10-CM

## 2019-11-20 DIAGNOSIS — R10.32: ICD-10-CM

## 2019-11-20 DIAGNOSIS — K21.9: ICD-10-CM

## 2019-11-20 DIAGNOSIS — I48.91: ICD-10-CM

## 2019-11-20 PROBLEM — N39.0 UTI (URINARY TRACT INFECTION): Status: ACTIVE | Noted: 2019-11-20

## 2019-11-20 PROBLEM — E03.9 HYPOTHYROIDISM: Status: ACTIVE | Noted: 2019-11-20

## 2019-11-20 PROBLEM — Z91.199 POOR COMPLIANCE: Status: ACTIVE | Noted: 2019-11-20

## 2019-11-20 PROBLEM — K52.9 GASTROENTERITIS: Status: ACTIVE | Noted: 2019-11-20

## 2019-11-20 LAB
ALBUMIN SERPL-MCNC: 3.5 GM/DL (ref 3.1–4.5)
ALP SERPL-CCNC: 108 U/L (ref 45–117)
ALT SERPL W P-5'-P-CCNC: 16 U/L (ref 12–78)
APPEARANCE UR: (no result)
APTT PPP: 24.2 SECONDS (ref 20–32.1)
AST SERPL-CCNC: 17 IU/L (ref 3–35)
BACTERIA #/AREA URNS HPF: (no result) /[HPF]
BASOPHILS # BLD AUTO: 0 10*3/UL (ref 0–0.1)
BASOPHILS NFR BLD AUTO: 0.2 % (ref 0–1)
BILIRUB UR QL STRIP: NEGATIVE
BUN SERPL-MCNC: 21 MG/DL (ref 7–24)
CHLORIDE SERPL-SCNC: 106 MMOL/L (ref 98–107)
COLOR UR: YELLOW
CREAT SERPL-MCNC: 0.88 MG/DL (ref 0.55–1.02)
EOSINOPHIL # BLD AUTO: 0 10*3/UL (ref 0–0.4)
EOSINOPHIL # BLD AUTO: 0.1 % (ref 1–4)
ERYTHROCYTE [DISTWIDTH] IN BLOOD BY AUTOMATED COUNT: 13.9 % (ref 0–14.5)
GLUCOSE UR QL: NEGATIVE
HCT VFR BLD AUTO: 37.1 % (ref 37–47)
HGB BLD-MCNC: 11.9 G/DL (ref 12–16)
HGB UR QL STRIP: (no result)
INR BLD: 1 (ref 2–3.5)
KETONES UR QL STRIP: (no result)
LEUKOCYTE ESTERASE UR QL STRIP: (no result)
LIPASE SERPL-CCNC: 44 U/L (ref 73–393)
LYMPHOCYTES # BLD AUTO: 0.6 10*3/UL (ref 1.3–4.4)
LYMPHOCYTES NFR BLD AUTO: 7.6 % (ref 27–41)
MCH RBC QN AUTO: 29.2 PG (ref 27–31)
MCHC RBC AUTO-ENTMCNC: 32.1 G/DL (ref 33–37)
MCV RBC AUTO: 91.2 FL (ref 81–99)
MONOCYTES # BLD AUTO: 0.4 10*3/UL (ref 0.1–1)
MONOCYTES NFR BLD MANUAL: 5.2 % (ref 3–9)
NEUT #: 7.3 10*3/UL (ref 2.3–7.9)
NEUT %: 86.5 % (ref 47–73)
NITRITE UR QL STRIP: NEGATIVE
NRBC BLD QL AUTO: 0 10*3/UL (ref 0–0)
PH UR STRIP: 6 [PH] (ref 5–9)
PLATELET # BLD AUTO: 247 10*3/UL (ref 130–400)
PMV BLD AUTO: 10.3 FL (ref 9.6–12.3)
POTASSIUM SERPL-SCNC: 3.6 MMOL/L (ref 3.5–5.1)
PROT SERPL-MCNC: 7.8 GM/DL (ref 6.4–8.2)
RBC # BLD AUTO: 4.07 10*6/UL (ref 4.1–5.1)
SODIUM SERPL-SCNC: 139 MMOL/L (ref 136–145)
SP GR UR: 1.02 (ref 1–1.03)
TROPONIN I SERPL-MCNC: < 0.015 NG/ML (ref ?–0.04)
TSH SERPL DL<=0.05 MIU/L-ACNC: 6.64 UIU/ML (ref 0.27–4.2)
UROBILINOGEN UR STRIP-MCNC: 0.2 E.U./DL (ref 0.2–1)
WBC #/AREA URNS HPF: (no result) WBC/HPF (ref 0–5)
WBC NRBC COR # BLD AUTO: 8.5 10*3/UL (ref 4.8–10.8)

## 2019-11-20 PROCEDURE — 87088 URINE BACTERIA CULTURE: CPT

## 2019-11-20 PROCEDURE — 2060000000 HC ICU INTERMEDIATE R&B

## 2019-11-20 PROCEDURE — 6370000000 HC RX 637 (ALT 250 FOR IP): Performed by: FAMILY MEDICINE

## 2019-11-20 PROCEDURE — 36415 COLL VENOUS BLD VENIPUNCTURE: CPT

## 2019-11-20 PROCEDURE — 6360000002 HC RX W HCPCS: Performed by: FAMILY MEDICINE

## 2019-11-20 PROCEDURE — 84443 ASSAY THYROID STIM HORMONE: CPT

## 2019-11-20 PROCEDURE — 2580000003 HC RX 258: Performed by: FAMILY MEDICINE

## 2019-11-20 RX ORDER — ERGOCALCIFEROL 1.25 MG/1
50000 CAPSULE ORAL WEEKLY
Status: DISCONTINUED | OUTPATIENT
Start: 2019-11-25 | End: 2019-11-25 | Stop reason: HOSPADM

## 2019-11-20 RX ORDER — ASPIRIN 81 MG/1
81 TABLET ORAL DAILY
Status: DISCONTINUED | OUTPATIENT
Start: 2019-11-21 | End: 2019-11-25 | Stop reason: HOSPADM

## 2019-11-20 RX ORDER — METOPROLOL SUCCINATE 25 MG/1
25 TABLET, EXTENDED RELEASE ORAL 2 TIMES DAILY
Status: DISCONTINUED | OUTPATIENT
Start: 2019-11-20 | End: 2019-11-25 | Stop reason: HOSPADM

## 2019-11-20 RX ORDER — ASPIRIN 81 MG/1
324 TABLET, CHEWABLE ORAL ONCE
Status: DISCONTINUED | OUTPATIENT
Start: 2019-11-20 | End: 2019-11-25 | Stop reason: HOSPADM

## 2019-11-20 RX ORDER — ATORVASTATIN CALCIUM 40 MG/1
40 TABLET, FILM COATED ORAL NIGHTLY
Status: DISCONTINUED | OUTPATIENT
Start: 2019-11-20 | End: 2019-11-25 | Stop reason: HOSPADM

## 2019-11-20 RX ORDER — SODIUM CHLORIDE 0.9 % (FLUSH) 0.9 %
10 SYRINGE (ML) INJECTION PRN
Status: DISCONTINUED | OUTPATIENT
Start: 2019-11-20 | End: 2019-11-25 | Stop reason: HOSPADM

## 2019-11-20 RX ORDER — ONDANSETRON 2 MG/ML
4 INJECTION INTRAMUSCULAR; INTRAVENOUS EVERY 6 HOURS PRN
Status: DISCONTINUED | OUTPATIENT
Start: 2019-11-20 | End: 2019-11-25 | Stop reason: HOSPADM

## 2019-11-20 RX ORDER — SODIUM CHLORIDE 0.9 % (FLUSH) 0.9 %
10 SYRINGE (ML) INJECTION EVERY 12 HOURS SCHEDULED
Status: DISCONTINUED | OUTPATIENT
Start: 2019-11-20 | End: 2019-11-25 | Stop reason: HOSPADM

## 2019-11-20 RX ORDER — LEVOTHYROXINE SODIUM 0.03 MG/1
25 TABLET ORAL DAILY
Status: DISCONTINUED | OUTPATIENT
Start: 2019-11-21 | End: 2019-11-25 | Stop reason: HOSPADM

## 2019-11-20 RX ORDER — DULOXETIN HYDROCHLORIDE 30 MG/1
30 CAPSULE, DELAYED RELEASE ORAL DAILY
Status: DISCONTINUED | OUTPATIENT
Start: 2019-11-21 | End: 2019-11-25 | Stop reason: HOSPADM

## 2019-11-20 RX ADMIN — ONDANSETRON HYDROCHLORIDE 4 MG: 2 INJECTION, SOLUTION INTRAMUSCULAR; INTRAVENOUS at 22:57

## 2019-11-20 RX ADMIN — METOPROLOL SUCCINATE 25 MG: 25 TABLET, EXTENDED RELEASE ORAL at 22:07

## 2019-11-20 RX ADMIN — SODIUM CHLORIDE, PRESERVATIVE FREE 10 ML: 5 INJECTION INTRAVENOUS at 22:07

## 2019-11-20 RX ADMIN — ATORVASTATIN CALCIUM 40 MG: 40 TABLET, FILM COATED ORAL at 22:07

## 2019-11-20 ASSESSMENT — PAIN SCALES - GENERAL
PAINLEVEL_OUTOF10: 0

## 2019-11-21 ENCOUNTER — APPOINTMENT (OUTPATIENT)
Dept: CT IMAGING | Age: 59
DRG: 045 | End: 2019-11-21
Attending: HOSPITALIST
Payer: MEDICAID

## 2019-11-21 LAB
ANION GAP SERPL CALCULATED.3IONS-SCNC: 19 MMOL/L (ref 7–16)
BUN BLDV-MCNC: 17 MG/DL (ref 8–23)
CALCIUM SERPL-MCNC: 9.1 MG/DL (ref 8.6–10.2)
CHLORIDE BLD-SCNC: 106 MMOL/L (ref 98–107)
CHOLESTEROL, TOTAL: 113 MG/DL (ref 0–199)
CO2: 20 MMOL/L (ref 22–29)
CREAT SERPL-MCNC: 0.9 MG/DL (ref 0.5–1)
GFR AFRICAN AMERICAN: >60
GFR NON-AFRICAN AMERICAN: >60 ML/MIN/1.73
GLUCOSE BLD-MCNC: 92 MG/DL (ref 74–99)
HCT VFR BLD CALC: 35.4 % (ref 34–48)
HDLC SERPL-MCNC: 45 MG/DL
HEMOGLOBIN: 11 G/DL (ref 11.5–15.5)
LDL CHOLESTEROL CALCULATED: 55 MG/DL (ref 0–99)
LV EF: 58 %
LVEF MODALITY: NORMAL
MCH RBC QN AUTO: 28.6 PG (ref 26–35)
MCHC RBC AUTO-ENTMCNC: 31.1 % (ref 32–34.5)
MCV RBC AUTO: 92.2 FL (ref 80–99.9)
PDW BLD-RTO: 14.3 FL (ref 11.5–15)
PLATELET # BLD: 243 E9/L (ref 130–450)
PMV BLD AUTO: 11 FL (ref 7–12)
POTASSIUM SERPL-SCNC: 4 MMOL/L (ref 3.5–5)
RBC # BLD: 3.84 E12/L (ref 3.5–5.5)
SODIUM BLD-SCNC: 145 MMOL/L (ref 132–146)
TRIGL SERPL-MCNC: 65 MG/DL (ref 0–149)
VLDLC SERPL CALC-MCNC: 13 MG/DL
WBC # BLD: 5.4 E9/L (ref 4.5–11.5)

## 2019-11-21 PROCEDURE — 93306 TTE W/DOPPLER COMPLETE: CPT

## 2019-11-21 PROCEDURE — 6360000004 HC RX CONTRAST MEDICATION: Performed by: RADIOLOGY

## 2019-11-21 PROCEDURE — 70498 CT ANGIOGRAPHY NECK: CPT

## 2019-11-21 PROCEDURE — 85027 COMPLETE CBC AUTOMATED: CPT

## 2019-11-21 PROCEDURE — 70496 CT ANGIOGRAPHY HEAD: CPT

## 2019-11-21 PROCEDURE — 2060000000 HC ICU INTERMEDIATE R&B

## 2019-11-21 PROCEDURE — 97162 PT EVAL MOD COMPLEX 30 MIN: CPT

## 2019-11-21 PROCEDURE — 97166 OT EVAL MOD COMPLEX 45 MIN: CPT

## 2019-11-21 PROCEDURE — 36415 COLL VENOUS BLD VENIPUNCTURE: CPT

## 2019-11-21 PROCEDURE — 2580000003 HC RX 258: Performed by: FAMILY MEDICINE

## 2019-11-21 PROCEDURE — 6360000002 HC RX W HCPCS: Performed by: FAMILY MEDICINE

## 2019-11-21 PROCEDURE — 97530 THERAPEUTIC ACTIVITIES: CPT

## 2019-11-21 PROCEDURE — 99253 IP/OBS CNSLTJ NEW/EST LOW 45: CPT | Performed by: PSYCHIATRY & NEUROLOGY

## 2019-11-21 PROCEDURE — 80061 LIPID PANEL: CPT

## 2019-11-21 PROCEDURE — 6370000000 HC RX 637 (ALT 250 FOR IP): Performed by: FAMILY MEDICINE

## 2019-11-21 PROCEDURE — 80048 BASIC METABOLIC PNL TOTAL CA: CPT

## 2019-11-21 RX ORDER — SODIUM CHLORIDE 0.9 % (FLUSH) 0.9 %
10 SYRINGE (ML) INJECTION
Status: ACTIVE | OUTPATIENT
Start: 2019-11-21 | End: 2019-11-21

## 2019-11-21 RX ADMIN — CEFTRIAXONE SODIUM 1 G: 1 INJECTION, POWDER, FOR SOLUTION INTRAMUSCULAR; INTRAVENOUS at 21:34

## 2019-11-21 RX ADMIN — RIVAROXABAN 20 MG: 20 TABLET, FILM COATED ORAL at 18:53

## 2019-11-21 RX ADMIN — IOPAMIDOL 60 ML: 755 INJECTION, SOLUTION INTRAVENOUS at 12:13

## 2019-11-21 RX ADMIN — DULOXETINE HYDROCHLORIDE 30 MG: 30 CAPSULE, DELAYED RELEASE ORAL at 18:52

## 2019-11-21 RX ADMIN — Medication 10 ML: at 12:13

## 2019-11-21 RX ADMIN — ASPIRIN 81 MG: 81 TABLET, COATED ORAL at 18:53

## 2019-11-21 RX ADMIN — METOPROLOL SUCCINATE 25 MG: 25 TABLET, EXTENDED RELEASE ORAL at 21:34

## 2019-11-21 RX ADMIN — SODIUM CHLORIDE, PRESERVATIVE FREE 10 ML: 5 INJECTION INTRAVENOUS at 09:24

## 2019-11-21 RX ADMIN — SODIUM CHLORIDE, PRESERVATIVE FREE 10 ML: 5 INJECTION INTRAVENOUS at 21:34

## 2019-11-21 RX ADMIN — ATORVASTATIN CALCIUM 40 MG: 40 TABLET, FILM COATED ORAL at 21:34

## 2019-11-21 RX ADMIN — LEVOTHYROXINE SODIUM 25 MCG: 25 TABLET ORAL at 18:53

## 2019-11-21 ASSESSMENT — ENCOUNTER SYMPTOMS
NAUSEA: 1
ABDOMINAL PAIN: 1
RESPIRATORY NEGATIVE: 1
BACK PAIN: 1
VOMITING: 1

## 2019-11-21 ASSESSMENT — VISUAL ACUITY: VA_NORMAL: 1

## 2019-11-21 ASSESSMENT — PAIN SCALES - GENERAL
PAINLEVEL_OUTOF10: 0

## 2019-11-22 LAB
ANION GAP SERPL CALCULATED.3IONS-SCNC: 14 MMOL/L (ref 7–16)
BUN BLDV-MCNC: 19 MG/DL (ref 8–23)
CALCIUM SERPL-MCNC: 9 MG/DL (ref 8.6–10.2)
CHLORIDE BLD-SCNC: 106 MMOL/L (ref 98–107)
CO2: 21 MMOL/L (ref 22–29)
CREAT SERPL-MCNC: 0.9 MG/DL (ref 0.5–1)
GFR AFRICAN AMERICAN: >60
GFR NON-AFRICAN AMERICAN: >60 ML/MIN/1.73
GLUCOSE BLD-MCNC: 103 MG/DL (ref 74–99)
POTASSIUM SERPL-SCNC: 3.9 MMOL/L (ref 3.5–5)
SODIUM BLD-SCNC: 141 MMOL/L (ref 132–146)
URINE CULTURE, ROUTINE: NORMAL

## 2019-11-22 PROCEDURE — 80048 BASIC METABOLIC PNL TOTAL CA: CPT

## 2019-11-22 PROCEDURE — 2060000000 HC ICU INTERMEDIATE R&B

## 2019-11-22 PROCEDURE — 99255 IP/OBS CONSLTJ NEW/EST HI 80: CPT | Performed by: INTERNAL MEDICINE

## 2019-11-22 PROCEDURE — 2580000003 HC RX 258: Performed by: FAMILY MEDICINE

## 2019-11-22 PROCEDURE — 93280 PM DEVICE PROGR EVAL DUAL: CPT | Performed by: INTERNAL MEDICINE

## 2019-11-22 PROCEDURE — 99233 SBSQ HOSP IP/OBS HIGH 50: CPT | Performed by: CLINICAL NURSE SPECIALIST

## 2019-11-22 PROCEDURE — 6370000000 HC RX 637 (ALT 250 FOR IP): Performed by: FAMILY MEDICINE

## 2019-11-22 PROCEDURE — 36415 COLL VENOUS BLD VENIPUNCTURE: CPT

## 2019-11-22 RX ADMIN — ASPIRIN 81 MG: 81 TABLET, COATED ORAL at 08:11

## 2019-11-22 RX ADMIN — SODIUM CHLORIDE, PRESERVATIVE FREE 10 ML: 5 INJECTION INTRAVENOUS at 08:11

## 2019-11-22 RX ADMIN — LEVOTHYROXINE SODIUM 25 MCG: 25 TABLET ORAL at 06:00

## 2019-11-22 RX ADMIN — METOPROLOL SUCCINATE 25 MG: 25 TABLET, EXTENDED RELEASE ORAL at 08:10

## 2019-11-22 RX ADMIN — SODIUM CHLORIDE, PRESERVATIVE FREE 10 ML: 5 INJECTION INTRAVENOUS at 20:45

## 2019-11-22 RX ADMIN — ATORVASTATIN CALCIUM 40 MG: 40 TABLET, FILM COATED ORAL at 20:45

## 2019-11-22 RX ADMIN — DULOXETINE HYDROCHLORIDE 30 MG: 30 CAPSULE, DELAYED RELEASE ORAL at 08:10

## 2019-11-22 RX ADMIN — METOPROLOL SUCCINATE 25 MG: 25 TABLET, EXTENDED RELEASE ORAL at 20:45

## 2019-11-22 RX ADMIN — RIVAROXABAN 20 MG: 20 TABLET, FILM COATED ORAL at 08:10

## 2019-11-22 ASSESSMENT — PAIN SCALES - GENERAL
PAINLEVEL_OUTOF10: 0

## 2019-11-23 PROCEDURE — 6370000000 HC RX 637 (ALT 250 FOR IP): Performed by: FAMILY MEDICINE

## 2019-11-23 PROCEDURE — 99253 IP/OBS CNSLTJ NEW/EST LOW 45: CPT | Performed by: PSYCHIATRY & NEUROLOGY

## 2019-11-23 PROCEDURE — 2060000000 HC ICU INTERMEDIATE R&B

## 2019-11-23 PROCEDURE — 2580000003 HC RX 258: Performed by: FAMILY MEDICINE

## 2019-11-23 RX ADMIN — METOPROLOL SUCCINATE 25 MG: 25 TABLET, EXTENDED RELEASE ORAL at 08:36

## 2019-11-23 RX ADMIN — DULOXETINE HYDROCHLORIDE 30 MG: 30 CAPSULE, DELAYED RELEASE ORAL at 08:36

## 2019-11-23 RX ADMIN — ASPIRIN 81 MG: 81 TABLET, COATED ORAL at 08:36

## 2019-11-23 RX ADMIN — LEVOTHYROXINE SODIUM 25 MCG: 25 TABLET ORAL at 06:28

## 2019-11-23 RX ADMIN — SODIUM CHLORIDE, PRESERVATIVE FREE 10 ML: 5 INJECTION INTRAVENOUS at 20:44

## 2019-11-23 RX ADMIN — METOPROLOL SUCCINATE 25 MG: 25 TABLET, EXTENDED RELEASE ORAL at 20:43

## 2019-11-23 RX ADMIN — RIVAROXABAN 20 MG: 20 TABLET, FILM COATED ORAL at 08:36

## 2019-11-23 RX ADMIN — ATORVASTATIN CALCIUM 40 MG: 40 TABLET, FILM COATED ORAL at 20:44

## 2019-11-23 ASSESSMENT — PAIN SCALES - GENERAL
PAINLEVEL_OUTOF10: 0

## 2019-11-24 PROCEDURE — 6370000000 HC RX 637 (ALT 250 FOR IP): Performed by: FAMILY MEDICINE

## 2019-11-24 PROCEDURE — 1200000000 HC SEMI PRIVATE

## 2019-11-24 PROCEDURE — 2580000003 HC RX 258: Performed by: FAMILY MEDICINE

## 2019-11-24 RX ADMIN — ATORVASTATIN CALCIUM 40 MG: 40 TABLET, FILM COATED ORAL at 20:13

## 2019-11-24 RX ADMIN — SODIUM CHLORIDE, PRESERVATIVE FREE 10 ML: 5 INJECTION INTRAVENOUS at 20:13

## 2019-11-24 RX ADMIN — METOPROLOL SUCCINATE 25 MG: 25 TABLET, EXTENDED RELEASE ORAL at 09:08

## 2019-11-24 RX ADMIN — DULOXETINE HYDROCHLORIDE 30 MG: 30 CAPSULE, DELAYED RELEASE ORAL at 09:08

## 2019-11-24 RX ADMIN — RIVAROXABAN 20 MG: 20 TABLET, FILM COATED ORAL at 09:08

## 2019-11-24 RX ADMIN — LEVOTHYROXINE SODIUM 25 MCG: 25 TABLET ORAL at 05:34

## 2019-11-24 RX ADMIN — SODIUM CHLORIDE, PRESERVATIVE FREE 10 ML: 5 INJECTION INTRAVENOUS at 09:09

## 2019-11-24 RX ADMIN — ASPIRIN 81 MG: 81 TABLET, COATED ORAL at 09:08

## 2019-11-24 ASSESSMENT — PAIN SCALES - GENERAL: PAINLEVEL_OUTOF10: 0

## 2019-11-25 VITALS
HEIGHT: 60 IN | RESPIRATION RATE: 16 BRPM | DIASTOLIC BLOOD PRESSURE: 57 MMHG | SYSTOLIC BLOOD PRESSURE: 111 MMHG | BODY MASS INDEX: 51.16 KG/M2 | HEART RATE: 70 BPM | OXYGEN SATURATION: 95 % | WEIGHT: 260.6 LBS | TEMPERATURE: 96.3 F

## 2019-11-25 PROCEDURE — 6370000000 HC RX 637 (ALT 250 FOR IP): Performed by: FAMILY MEDICINE

## 2019-11-25 PROCEDURE — 2580000003 HC RX 258: Performed by: FAMILY MEDICINE

## 2019-11-25 RX ORDER — ASPIRIN 81 MG/1
81 TABLET ORAL DAILY
Qty: 30 TABLET | Refills: 3 | Status: SHIPPED | OUTPATIENT
Start: 2019-11-26

## 2019-11-25 RX ADMIN — SODIUM CHLORIDE, PRESERVATIVE FREE 10 ML: 5 INJECTION INTRAVENOUS at 09:29

## 2019-11-25 RX ADMIN — ASPIRIN 81 MG: 81 TABLET, COATED ORAL at 09:29

## 2019-11-25 RX ADMIN — LEVOTHYROXINE SODIUM 25 MCG: 25 TABLET ORAL at 06:23

## 2019-11-25 RX ADMIN — DULOXETINE HYDROCHLORIDE 30 MG: 30 CAPSULE, DELAYED RELEASE ORAL at 09:29

## 2019-11-25 RX ADMIN — METOPROLOL SUCCINATE 25 MG: 25 TABLET, EXTENDED RELEASE ORAL at 09:29

## 2019-11-25 RX ADMIN — ERGOCALCIFEROL 50000 UNITS: 1.25 CAPSULE ORAL at 09:29

## 2019-11-25 RX ADMIN — RIVAROXABAN 20 MG: 20 TABLET, FILM COATED ORAL at 09:29

## 2019-11-25 ASSESSMENT — PAIN SCALES - GENERAL: PAINLEVEL_OUTOF10: 0

## 2019-12-20 PROBLEM — N39.0 UTI (URINARY TRACT INFECTION): Status: RESOLVED | Noted: 2019-11-20 | Resolved: 2019-12-20

## 2021-10-28 ENCOUNTER — HOSPITAL ENCOUNTER (OUTPATIENT)
Dept: HOSPITAL 83 - MAMMO | Age: 61
Discharge: HOME | End: 2021-10-28
Attending: INTERNAL MEDICINE
Payer: MEDICAID

## 2021-10-28 DIAGNOSIS — Z12.31: Primary | ICD-10-CM

## 2021-10-28 DIAGNOSIS — N64.89: ICD-10-CM

## 2022-05-09 LAB — TROPONIN I, HIGH SENSITIVITY: 217 NG/L (ref 0–53)

## 2022-08-12 ENCOUNTER — HOSPITAL ENCOUNTER (EMERGENCY)
Dept: HOSPITAL 83 - ED | Age: 62
Discharge: ANOTHER HOSPITAL | End: 2022-08-12
Payer: MEDICAID

## 2022-08-12 VITALS — HEIGHT: 55 IN | WEIGHT: 277.4 LBS

## 2022-08-12 DIAGNOSIS — Z79.899: ICD-10-CM

## 2022-08-12 DIAGNOSIS — I10: ICD-10-CM

## 2022-08-12 DIAGNOSIS — Z79.82: ICD-10-CM

## 2022-08-12 DIAGNOSIS — K21.9: ICD-10-CM

## 2022-08-12 DIAGNOSIS — I48.91: ICD-10-CM

## 2022-08-12 DIAGNOSIS — I21.4: Primary | ICD-10-CM

## 2022-08-12 LAB
ALP SERPL-CCNC: 70 U/L (ref 45–117)
ALT SERPL W P-5'-P-CCNC: 16 U/L (ref 12–78)
APTT PPP: 22.2 SECONDS (ref 20–32.1)
AST SERPL-CCNC: 14 IU/L (ref 3–35)
BASOPHILS # BLD AUTO: 0 10*3/UL (ref 0–0.1)
BASOPHILS NFR BLD AUTO: 0.5 % (ref 0–1)
BUN SERPL-MCNC: 28 MG/DL (ref 7–24)
CHLORIDE SERPL-SCNC: 112 MMOL/L (ref 98–107)
CREAT SERPL-MCNC: 0.88 MG/DL (ref 0.55–1.02)
EOSINOPHIL # BLD AUTO: 0.1 10*3/UL (ref 0–0.4)
EOSINOPHIL # BLD AUTO: 1.3 % (ref 1–4)
ERYTHROCYTE [DISTWIDTH] IN BLOOD BY AUTOMATED COUNT: 14.1 % (ref 0–14.5)
HCT VFR BLD AUTO: 38.4 % (ref 37–47)
INR BLD: 1 (ref 2–3.5)
LYMPHOCYTES # BLD AUTO: 1 10*3/UL (ref 1.3–4.4)
LYMPHOCYTES NFR BLD AUTO: 13.2 % (ref 27–41)
MCH RBC QN AUTO: 28.4 PG (ref 27–31)
MCHC RBC AUTO-ENTMCNC: 31.5 G/DL (ref 33–37)
MCV RBC AUTO: 90.1 FL (ref 81–99)
MONOCYTES # BLD AUTO: 0.4 10*3/UL (ref 0.1–1)
MONOCYTES NFR BLD MANUAL: 5.4 % (ref 3–9)
NEUT #: 6 10*3/UL (ref 2.3–7.9)
NEUT %: 79.5 % (ref 47–73)
NRBC BLD QL AUTO: 0 10*3/UL (ref 0–0)
PLATELET # BLD AUTO: 262 10*3/UL (ref 130–400)
PMV BLD AUTO: 9.8 FL (ref 9.6–12.3)
POTASSIUM SERPL-SCNC: 3.8 MMOL/L (ref 3.5–5.1)
PROT SERPL-MCNC: 7.3 GM/DL (ref 6.4–8.2)
RBC # BLD AUTO: 4.26 10*6/UL (ref 4.1–5.1)
SODIUM SERPL-SCNC: 141 MMOL/L (ref 136–145)
WBC NRBC COR # BLD AUTO: 7.6 10*3/UL (ref 4.8–10.8)

## (undated) DEVICE — CLAMP INSERT: Brand: STEALTH® CLAMP INSERT

## (undated) DEVICE — TOTAL TRAY, 16FR 10ML SIL FOLEY, URN: Brand: MEDLINE

## (undated) DEVICE — Device

## (undated) DEVICE — GOWN,SIRUS,FABRNF,L,20/CS: Brand: MEDLINE

## (undated) DEVICE — PACK,UNIV, II AURORA: Brand: MEDLINE

## (undated) DEVICE — GLOVE SURG SZ 75 STD WHT LTX SYN POLYMER BEAD REINF ANTI RL

## (undated) DEVICE — TOWEL,OR,DSP,ST,BLUE,STD,6/PK,12PK/CS: Brand: MEDLINE

## (undated) DEVICE — DOUBLE BASIN SET: Brand: MEDLINE INDUSTRIES, INC.

## (undated) DEVICE — FOGARTY THRU-LUMEN EMBOLECTOMY CATHETER 3F 80CM: Brand: FOGARTY

## (undated) DEVICE — 1.5L THIN WALL CAN: Brand: CRD

## (undated) DEVICE — GLOVE SURG L12IN SZ 65FNGR THK94MIL TRNSLUC YEL LTX

## (undated) DEVICE — SOLUTION IV 500ML 0.9% SOD CHL INJ USP CONT EXCEL

## (undated) DEVICE — GOWN,AURORA,NONREINF,RAGLAN,L,STERILE: Brand: MEDLINE

## (undated) DEVICE — LABEL MED 4 IN SURG PANEL W/ PEN STRL

## (undated) DEVICE — 1 ML TUBERCULIN SYRINGE REGULAR TIP: Brand: MONOJECT

## (undated) DEVICE — SOLUTION IV IRRIG POUR BRL 0.9% SODIUM CHL 2F7124

## (undated) DEVICE — SKIN AFFIX SURG ADHESIVE 72/CS 0.55ML: Brand: MEDLINE

## (undated) DEVICE — GLOVE ORANGE PI 8   MSG9080

## (undated) DEVICE — FOGARTY EMBOLECTOMY CATHETER: Brand: FOGARTY

## (undated) DEVICE — CLIP INT SM TI EZ LD LIG SYS WECK HORZ

## (undated) DEVICE — SET INSTR ART 1

## (undated) DEVICE — ELECTRODE PT RET AD L9FT HI MOIST COND ADH HYDRGEL CORDED

## (undated) DEVICE — Z INACTIVE USE 2641837 CLIP LIG M BLU TI HRT SHP WIRE HORZ 600 PER BX

## (undated) DEVICE — GAUZE,SPONGE,4"X4",16PLY,XRAY,STRL,LF: Brand: MEDLINE

## (undated) DEVICE — SET SURG INSTR ART III

## (undated) DEVICE — Z DISCONTINUED PER MEDLINE USE 2425483 TAPE UMB L30IN DIA1/8IN WHT COT NONABSORBABLE W/O NDL FOR

## (undated) DEVICE — CATHETER ETER IV 20GA L1IN POLYUR STR RADPQ INTROCAN SFTY

## (undated) DEVICE — 3M™ BAIR HUGGER® UNDERBODY BLANKET, FULL ACCESS, 10 PER CASE 63500: Brand: BAIR HUGGER™

## (undated) DEVICE — LOOP VES W25MM THK1MM MAXI RED SIL FLD REPELLENT 100 PER

## (undated) DEVICE — 3M™ IOBAN™ 2 ANTIMICROBIAL INCISE DRAPE 6640EZ: Brand: IOBAN™ 2

## (undated) DEVICE — GEL US 20GM NONIRRITATING OVERWRAPPED FILE PCH TRNSMIT

## (undated) DEVICE — DILATOR ART

## (undated) DEVICE — SURGICAL PROCEDURE PACK VASC MAJ CUST

## (undated) DEVICE — CHLORAPREP 26ML ORANGE

## (undated) DEVICE — FOGARTY THRU-LUMEN EMBOLECTOMY CATHETER 4F 80CM: Brand: FOGARTY

## (undated) DEVICE — TRAY,SKIN SCRUB,DRY,W/GAUZE: Brand: MEDLINE

## (undated) DEVICE — SOLUTION IV IRRIG WATER 1000ML POUR BRL 2F7114

## (undated) DEVICE — STANDARD HYPODERMIC NEEDLE,POLYPROPYLENE HUB: Brand: MONOJECT

## (undated) DEVICE — CONVERTORS STOCKINETTE: Brand: CONVERTORS

## (undated) DEVICE — GOWN,SIRUS,FABRNF,XL,20/CS: Brand: MEDLINE